# Patient Record
Sex: MALE | Race: OTHER | NOT HISPANIC OR LATINO | Employment: FULL TIME | URBAN - METROPOLITAN AREA
[De-identification: names, ages, dates, MRNs, and addresses within clinical notes are randomized per-mention and may not be internally consistent; named-entity substitution may affect disease eponyms.]

---

## 2019-01-01 ENCOUNTER — HOSPITAL ENCOUNTER (EMERGENCY)
Facility: HOSPITAL | Age: 54
Discharge: HOME | End: 2019-01-01
Attending: EMERGENCY MEDICINE | Admitting: EMERGENCY MEDICINE

## 2019-01-01 ENCOUNTER — APPOINTMENT (EMERGENCY)
Dept: CT IMAGING | Facility: HOSPITAL | Age: 54
End: 2019-01-01

## 2019-01-01 VITALS
WEIGHT: 168 LBS | DIASTOLIC BLOOD PRESSURE: 80 MMHG | HEIGHT: 65 IN | BODY MASS INDEX: 27.99 KG/M2 | SYSTOLIC BLOOD PRESSURE: 135 MMHG | TEMPERATURE: 97 F | RESPIRATION RATE: 16 BRPM | HEART RATE: 68 BPM | OXYGEN SATURATION: 98 %

## 2019-01-01 DIAGNOSIS — R10.31 RIGHT INGUINAL PAIN: Primary | ICD-10-CM

## 2019-01-01 DIAGNOSIS — L03.314 CELLULITIS OF RIGHT GROIN: ICD-10-CM

## 2019-01-01 LAB
ANION GAP SERPL CALCULATED.3IONS-SCNC: 5 MMOL/L (ref 5–14)
BACTERIA UR QL AUTO: ABNORMAL /HPF
BASOPHILS # BLD AUTO: 0 THOUSANDS/ΜL (ref 0–0.1)
BASOPHILS NFR BLD AUTO: 0 % (ref 0–1)
BILIRUB UR QL STRIP: NEGATIVE
BUN SERPL-MCNC: 15 MG/DL (ref 5–25)
CALCIUM SERPL-MCNC: 8.9 MG/DL (ref 8.4–10.2)
CHLORIDE SERPL-SCNC: 100 MMOL/L (ref 97–108)
CLARITY UR: CLEAR
CO2 SERPL-SCNC: 33 MMOL/L (ref 22–30)
COLOR UR: ABNORMAL
CREAT SERPL-MCNC: 0.82 MG/DL (ref 0.7–1.5)
EOSINOPHIL # BLD AUTO: 0.1 THOUSAND/ΜL (ref 0–0.4)
EOSINOPHIL NFR BLD AUTO: 1 % (ref 0–6)
ERYTHROCYTE [DISTWIDTH] IN BLOOD BY AUTOMATED COUNT: 13.1 %
GFR SERPL CREATININE-BSD FRML MDRD: 100 ML/MIN/1.73SQ M
GLUCOSE SERPL-MCNC: 92 MG/DL (ref 70–99)
GLUCOSE UR STRIP-MCNC: NEGATIVE MG/DL
HCT VFR BLD AUTO: 40.1 % (ref 41–53)
HGB BLD-MCNC: 13.4 G/DL (ref 13.5–17.5)
HGB UR QL STRIP.AUTO: 25
KETONES UR STRIP-MCNC: NEGATIVE MG/DL
LACTATE SERPL-SCNC: 1 MMOL/L (ref 0.7–2)
LEUKOCYTE ESTERASE UR QL STRIP: NEGATIVE
LYMPHOCYTES # BLD AUTO: 1.1 THOUSANDS/ΜL (ref 0.5–4)
LYMPHOCYTES NFR BLD AUTO: 12 % (ref 25–45)
MCH RBC QN AUTO: 29.9 PG (ref 26–34)
MCHC RBC AUTO-ENTMCNC: 33.5 G/DL (ref 31–36)
MCV RBC AUTO: 89 FL (ref 80–100)
MONOCYTES # BLD AUTO: 0.5 THOUSAND/ΜL (ref 0.2–0.9)
MONOCYTES NFR BLD AUTO: 6 % (ref 1–10)
NEUTROPHILS # BLD AUTO: 7.4 THOUSANDS/ΜL (ref 1.8–7.8)
NEUTS SEG NFR BLD AUTO: 81 % (ref 45–65)
NITRITE UR QL STRIP: NEGATIVE
NON-SQ EPI CELLS URNS QL MICRO: ABNORMAL /HPF
PH UR STRIP.AUTO: 5 [PH] (ref 4.5–8)
PLATELET # BLD AUTO: 249 THOUSANDS/UL (ref 150–450)
PMV BLD AUTO: 6 FL (ref 8.9–12.7)
POTASSIUM SERPL-SCNC: 4.1 MMOL/L (ref 3.6–5)
PROT UR STRIP-MCNC: ABNORMAL MG/DL
RBC # BLD AUTO: 4.49 MILLION/UL (ref 4.5–5.9)
RBC #/AREA URNS AUTO: ABNORMAL /HPF
SODIUM SERPL-SCNC: 138 MMOL/L (ref 137–147)
SP GR UR STRIP.AUTO: 1.03 (ref 1–1.04)
UROBILINOGEN UA: NEGATIVE MG/DL
WBC # BLD AUTO: 9.2 THOUSAND/UL (ref 4.5–11)
WBC #/AREA URNS AUTO: ABNORMAL /HPF

## 2019-01-01 PROCEDURE — 81001 URINALYSIS AUTO W/SCOPE: CPT | Performed by: PHYSICIAN ASSISTANT

## 2019-01-01 PROCEDURE — 74177 CT ABD & PELVIS W/CONTRAST: CPT

## 2019-01-01 PROCEDURE — 83605 ASSAY OF LACTIC ACID: CPT | Performed by: PHYSICIAN ASSISTANT

## 2019-01-01 PROCEDURE — 36415 COLL VENOUS BLD VENIPUNCTURE: CPT | Performed by: PHYSICIAN ASSISTANT

## 2019-01-01 PROCEDURE — 85025 COMPLETE CBC W/AUTO DIFF WBC: CPT | Performed by: PHYSICIAN ASSISTANT

## 2019-01-01 PROCEDURE — 96374 THER/PROPH/DIAG INJ IV PUSH: CPT

## 2019-01-01 PROCEDURE — 80048 BASIC METABOLIC PNL TOTAL CA: CPT | Performed by: PHYSICIAN ASSISTANT

## 2019-01-01 PROCEDURE — 99284 EMERGENCY DEPT VISIT MOD MDM: CPT

## 2019-01-01 PROCEDURE — 96361 HYDRATE IV INFUSION ADD-ON: CPT

## 2019-01-01 RX ORDER — SULFAMETHOXAZOLE AND TRIMETHOPRIM 800; 160 MG/1; MG/1
1 TABLET ORAL 2 TIMES DAILY
Qty: 20 TABLET | Refills: 0 | Status: SHIPPED | OUTPATIENT
Start: 2019-01-01 | End: 2019-01-11

## 2019-01-01 RX ORDER — KETOROLAC TROMETHAMINE 30 MG/ML
15 INJECTION, SOLUTION INTRAMUSCULAR; INTRAVENOUS ONCE
Status: COMPLETED | OUTPATIENT
Start: 2019-01-01 | End: 2019-01-01

## 2019-01-01 RX ORDER — CEPHALEXIN 500 MG/1
500 CAPSULE ORAL 4 TIMES DAILY
Qty: 40 CAPSULE | Refills: 0 | Status: SHIPPED | OUTPATIENT
Start: 2019-01-01 | End: 2019-01-11

## 2019-01-01 RX ADMIN — SODIUM CHLORIDE 1000 ML: 0.9 INJECTION, SOLUTION INTRAVENOUS at 15:18

## 2019-01-01 RX ADMIN — KETOROLAC TROMETHAMINE 15 MG: 30 INJECTION, SOLUTION INTRAMUSCULAR at 15:20

## 2019-01-01 RX ADMIN — IOHEXOL 100 ML: 350 INJECTION, SOLUTION INTRAVENOUS at 16:26

## 2019-01-01 NOTE — ED PROVIDER NOTES
History  Chief Complaint   Patient presents with    Testicle Swelling     I have swelling and pain to my right testicle  There was a big pimple there, and  I popped it  This is a 50-year-old male patient who states for the last 4 days he has had pain and swelling originally stated over his right testicle however it is groin  The nursing notes stated that he popped a large pimple however he stated he is attempting to pop a but nothing has come to ahead and he has been unable to pop a pimple he has pain and fullness over his right inguinal canal down towards his scrotum but does not involve the scrotum  It hurts to do anything  He has taken nothing for the pain he does look in discomfort  No fever no chills no headache blurred vision double vision no cough congestion sore throat nausea vomiting diarrhea abdominal pain no chest pain or shortness of breath no urinary symptoms no testicular pain and swelling  No penile discharge  Differential diagnosis includes not limited to abscess, inguinal hernia, cellulitis he will have a CT scan            None       No past medical history on file  No past surgical history on file  No family history on file  I have reviewed and agree with the history as documented  Social History   Substance Use Topics    Smoking status: Not on file    Smokeless tobacco: Not on file    Alcohol use Not on file        Review of Systems   All other systems reviewed and are negative  Physical Exam  Physical Exam   Constitutional: He appears well-developed and well-nourished  HENT:   Head: Normocephalic and atraumatic  Right Ear: External ear normal    Left Ear: External ear normal    Nose: Nose normal    Mouth/Throat: Oropharynx is clear and moist    Eyes: Pupils are equal, round, and reactive to light  Conjunctivae are normal    Neck: Normal range of motion  Neck supple  Cardiovascular: Normal rate and regular rhythm      Pulmonary/Chest: Effort normal and breath sounds normal    Abdominal: Soft  Bowel sounds are normal  There is no tenderness  Genitourinary: Testes normal and penis normal  Cremasteric reflex is present  Neurological: He is alert  Skin: Skin is warm  Psychiatric: He has a normal mood and affect  His behavior is normal    Nursing note and vitals reviewed  Vital Signs  ED Triage Vitals [01/01/19 1400]   Temperature Pulse Respirations Blood Pressure SpO2   (!) 97 °F (36 1 °C) 71 16 143/83 98 %      Temp Source Heart Rate Source Patient Position - Orthostatic VS BP Location FiO2 (%)   Tympanic -- Sitting Left arm --      Pain Score       7           Vitals:    01/01/19 1400   BP: 143/83   Pulse: 71   Patient Position - Orthostatic VS: Sitting       Visual Acuity      ED Medications  Medications   sodium chloride 0 9 % bolus 1,000 mL (1,000 mL Intravenous New Bag 1/1/19 1518)   ketorolac (TORADOL) injection 15 mg (15 mg Intravenous Given 1/1/19 1520)   iohexol (OMNIPAQUE) 350 MG/ML injection (MULTI-DOSE) 100 mL (100 mL Intravenous Given 1/1/19 1626)       Diagnostic Studies  Results Reviewed     Procedure Component Value Units Date/Time    Basic metabolic panel [[id number]]  (Abnormal) Collected:  01/01/19 1516    Lab Status:  Final result Specimen:  Blood from Arm, Left Updated:  01/01/19 1550     Sodium 138 mmol/L      Potassium 4 1 mmol/L      Chloride 100 mmol/L      CO2 33 (H) mmol/L      ANION GAP 5 mmol/L      BUN 15 mg/dL      Creatinine 0 82 mg/dL      Glucose 92 mg/dL      Calcium 8 9 mg/dL      eGFR 100 ml/min/1 73sq m     Narrative:         National Kidney Disease Education Program recommendations are as follows:  GFR calculation is accurate only with a steady state creatinine  Chronic Kidney disease less than 60 ml/min/1 73 sq  meters  Kidney failure less than 15 ml/min/1 73 sq  meters      Lactic acid, plasma [[id number]]  (Normal) Collected:  01/01/19 1516    Lab Status:  Final result Specimen:  Blood from Arm, Left Updated: 01/01/19 1546     LACTIC ACID 1 0 mmol/L     Narrative:         Result may be elevated if tourniquet was used during collection  CBC and differential [[id number]]  (Abnormal) Collected:  01/01/19 1516    Lab Status:  Final result Specimen:  Blood from Arm, Left Updated:  01/01/19 1540     WBC 9 20 Thousand/uL      RBC 4 49 (L) Million/uL      Hemoglobin 13 4 (L) g/dL      Hematocrit 40 1 (L) %      MCV 89 fL      MCH 29 9 pg      MCHC 33 5 g/dL      RDW 13 1 %      MPV 6 0 (L) fL      Platelets 400 Thousands/uL      Neutrophils Relative 81 (H) %      Lymphocytes Relative 12 (L) %      Monocytes Relative 6 %      Eosinophils Relative 1 %      Basophils Relative 0 %      Neutrophils Absolute 7 40 Thousands/µL      Lymphocytes Absolute 1 10 Thousands/µL      Monocytes Absolute 0 50 Thousand/µL      Eosinophils Absolute 0 10 Thousand/µL      Basophils Absolute 0 00 Thousands/µL     Urine Microscopic [[id number]]  (Abnormal) Collected:  01/01/19 1501    Lab Status:  Final result Specimen:  Urine from Urine, Clean Catch Updated:  01/01/19 1525     RBC, UA 0-1 (A) /hpf      WBC, UA 0-1 (A) /hpf      Epithelial Cells Occasional /hpf      Bacteria, UA None Seen /hpf     UA w Reflex to Microscopic [[id number]]  (Abnormal) Collected:  01/01/19 1501    Lab Status:  Final result Specimen:  Urine from Urine, Clean Catch Updated:  01/01/19 1513     Color, UA Misty (A)     Clarity, UA Clear     Specific [place], UA 1 030     pH, UA 5 0     Leukocytes, UA Negative     Nitrite, UA Negative     Protein, UA 15 (Trace) (A) mg/dl      Glucose, UA Negative mg/dl      Ketones, UA Negative mg/dl      Bilirubin, UA Negative     Blood, UA 25 0 (A)     UROBILINOGEN UA Negative mg/dL                  CT abdomen pelvis with contrast   Final Result by Clarissa Bedoya MD (01/01 1646)   1  Inflammatory stranding and fluid present in the right inguinal region without well-circumscribed rim-enhancing collection to suggest abscess     2  Cardiomegaly  3   Hepatomegaly  4   Mild stool retention throughout the colon  Workstation performed: [id number]                    Procedures  Procedures       Phone Contacts  ED Phone Contact    ED Course  ED Course as of Jan 01 1717   Tue Jan 01, 2019 1713 CT scan reveals some inflammatory fluid right inguinal canal without abscess I spoke with Sonal Putnam and explained case in detail he feels patient should be placed on antibiotics and anti-inflammatories and he will see the patient tomorrow  The CT scan did not reveal any hernias  Patient does feel better after the Toradol  No white count no fever no testicular pain or swelling                                [facility]  CritCare Time    Disposition  Final diagnoses:   Right inguinal pain   Cellulitis of right groin     Time reflects when diagnosis was documented in both MDM as applicable and the Disposition within this note     Time User Action Codes Description Comment    1/1/2019  5:15 PM [facility], [address] [place] [address] [R10 30] Deep right inguinal pain     1/1/2019  5:15 PM [facility], Jonathan Remove [R10 30] Deep right inguinal pain     1/1/2019  5:15 PM [facility], [address] [place] [address] [R10 31] Right inguinal pain     1/1/2019  5:15 PM [facility], [address] [place] [address] [F11 039] Cellulitis of right groin       ED Disposition     ED Disposition Condition Comment    Discharge  Corbin Conception discharge to home/self care      Condition at discharge: Good        Follow-up Information     Follow up With Specialties Details Why Contact Info    Shyanne Berman MD General Surgery Go in 1 day  Barbra Strauss 1620            Patient's Medications   Discharge Prescriptions    CEPHALEXIN (KEFLEX) 500 MG CAPSULE    Take 1 capsule (500 mg total) by mouth 4 (four) times a day for 10 days       Start Date: 1/1/2019  End Date: 1/11/2019       Order Dose: 500 mg       Quantity: 40 capsule    Refills: 0    DICLOFENAC SODIUM (VOLTAREN) 50 MG EC TABLET    Take 1 tablet (50 mg total) by mouth 2 (two) times a day       Start Date: 1/1/2019  End Date: --       Order Dose: 50 mg       Quantity: 10 tablet    Refills: 0    SULFAMETHOXAZOLE-TRIMETHOPRIM (BACTRIM DS) 800-160 MG PER TABLET    Take 1 tablet by mouth 2 (two) times a day for 10 days smx-tmp DS (BACTRIM) 800-160 mg tabs (1tab q12 D10)       Start Date: 1/1/2019  End Date: 1/11/2019       Order Dose: 1 tablet       Quantity: 20 tablet    Refills: 0     No discharge procedures on file      ED Provider  Electronically Signed by           Nadine Ramires PA-C  01/01/19 7650

## 2019-01-01 NOTE — DISCHARGE INSTRUCTIONS
Cellulitis   WHAT YOU NEED TO KNOW:   Cellulitis is a skin infection caused by bacteria  Cellulitis may go away on its own or you may need treatment  Your healthcare provider may draw a [place] around the outside edges of your cellulitis  If your cellulitis spreads, your healthcare provider will see it outside of the [place]  DISCHARGE INSTRUCTIONS:   Call 911 if:   · You have sudden trouble breathing or chest pain  Return to the emergency department if:   · Your wound gets larger and more painful  · You feel a crackling under your skin when you touch it  · You have purple dots or bumps on your skin, or you see bleeding under your skin  · You have new swelling and pain in your legs  · The red, warm, swollen area gets larger  · You see red streaks coming from the infected area  Contact your healthcare provider if:   · You have a fever  · Your fever or pain does not go away or gets worse  · The area does not get smaller after 2 days of antibiotics  · Your skin is flaking or peeling off  · You have questions or concerns about your condition or care  Medicines:   · Antibiotics  help treat the bacterial infection  · NSAIDs , such as ibuprofen, help decrease swelling, pain, and fever  NSAIDs can cause stomach bleeding or kidney problems in certain people  If you take blood thinner medicine, always ask if NSAIDs are safe for you  Always read the medicine label and follow directions  Do not give these medicines to children under 10months of age without direction from your child's healthcare provider  · Acetaminophen  decreases pain and fever  It is available without a doctor's order  Ask how much to take and how often to take it  Follow directions  Read the labels of all other medicines you are using to see if they also contain acetaminophen, or ask your doctor or pharmacist  Acetaminophen can cause liver damage if not taken correctly   Do not use more than 4 grams (4,000 milligrams) total of acetaminophen in one day  · Take your medicine as directed  Contact your healthcare provider if you think your medicine is not helping or if you have side effects  Tell him or her if you are allergic to any medicine  Keep a list of the medicines, vitamins, and herbs you take  Include the amounts, and when and why you take them  Bring the list or the pill bottles to follow-up visits  Carry your medicine list with you in case of an emergency  Self-care:   · Elevate the area above the level of your heart  as often as you can  This will help decrease swelling and pain  Prop the area on pillows or blankets to keep it elevated comfortably  · Clean the area daily until the wound scabs over  Gently wash the area with soap and water  Pat dry  Use dressings as directed  · Place cool or warm, wet cloths on the area as directed  Use clean cloths and clean water  Leave it on the area until the cloth is room temperature  Pat the area dry with a clean, dry cloth  The cloths may help decrease pain  Prevent cellulitis:   · Do not scratch bug bites or areas of injury  You increase your risk for cellulitis by scratching these areas  · Do not share personal items, such as towels, clothing, and razors  · Clean exercise equipment  with germ-killing  before and after you use it  · Wash your hands often  Use soap and water  Wash your hands after you use the bathroom, change a child's diapers, or sneeze  Wash your hands before you prepare or eat food  Use lotion to prevent dry, cracked skin  · Wear pressure stockings as directed  You may be told to wear the stockings if you have peripheral edema  The stockings improve blood flow and decrease swelling  · Treat athlete's foot  This can help prevent the spread of a bacterial skin infection  Follow up with your healthcare provider within 3 days, or as directed:   Your healthcare provider will check if your cellulitis is getting better  You may need different medicine  Write down your questions so you remember to ask them during your visits  © 2017 [employer] Information is for End User's use only and may not be sold, redistributed or otherwise used for commercial purposes  All illustrations and images included in CareNotes® are the copyrighted property of A D A M , Inc  or [employer]  The above information is an  only  It is not intended as medical advice for individual conditions or treatments  Talk to your doctor, nurse or pharmacist before following any medical regimen to see if it is safe and effective for you  Groin Pain   WHAT YOU NEED TO KNOW:   Groin pain may be felt only in your groin, or it may spread to your buttocks, thigh, or knee  An injury to your hip joint, pelvic area, lower back, or thighs can cause groin pain  DISCHARGE INSTRUCTIONS:   Medicines: You may need any of the following:  · NSAIDs , such as ibuprofen, help decrease swelling, pain, and fever  This medicine is available with or without a doctor's order  NSAIDs can cause stomach bleeding or kidney problems in certain people  If you take blood thinner medicine, always ask if NSAIDs are safe for you  Always read the medicine label and follow directions  Do not give these medicines to children under 10months of age without direction from your child's healthcare provider  · Acetaminophen  decreases pain  It is available without a doctor's order  Ask how much to take and how often to take it  Follow directions  Acetaminophen can cause liver damage if not taken correctly  · Take your medicine as directed  Contact your healthcare provider if you think your medicine is not helping or if you have side effects  Tell him of her if you are allergic to any medicine  Keep a list of the medicines, vitamins, and herbs you take  Include the amounts, and when and why you take them   Bring the list or the pill bottles to follow-up visits  Carry your medicine list with you in case of an emergency  Follow up with your healthcare provider as directed: You may need to return for more tests  Write down your questions so you remember to ask them during your visits  Self-care:   · Rest  as much as possible  Avoid activities that cause or increase your pain  · Apply ice  on your groin for 15 to 20 minutes every hour or as directed  Use an ice pack, or put crushed ice in a plastic bag  Cover it with a towel  Ice helps prevent tissue damage and decreases swelling and pain  · Apply heat  on your groin for 20 to 30 minutes every 2 hours for as many days as directed  Heat helps decrease pain and muscle spasms  Contact your healthcare provider if:   · You have a fever  · You have questions or concerns about your condition or care  Return to the emergency department if:   · You have severe pain even after you take medicine  · You have pain or burning when you urinate  · You have pain on your side that spreads to your groin  © 2017 [facility] [facility] Information is for End User's use only and may not be sold, redistributed or otherwise used for commercial purposes  All illustrations and images included in CareNotes® are the copyrighted property of A D A M , Inc  or [employer]  The above information is an  only  It is not intended as medical advice for individual conditions or treatments  Talk to your doctor, nurse or pharmacist before following any medical regimen to see if it is safe and effective for you

## 2019-08-31 ENCOUNTER — APPOINTMENT (EMERGENCY)
Dept: RADIOLOGY | Facility: HOSPITAL | Age: 54
End: 2019-08-31

## 2019-08-31 ENCOUNTER — HOSPITAL ENCOUNTER (EMERGENCY)
Facility: HOSPITAL | Age: 54
Discharge: HOME | End: 2019-08-31
Attending: EMERGENCY MEDICINE | Admitting: EMERGENCY MEDICINE

## 2019-08-31 VITALS
DIASTOLIC BLOOD PRESSURE: 85 MMHG | OXYGEN SATURATION: 99 % | BODY MASS INDEX: 26.92 KG/M2 | SYSTOLIC BLOOD PRESSURE: 136 MMHG | WEIGHT: 161.8 LBS | HEART RATE: 77 BPM | RESPIRATION RATE: 16 BRPM | TEMPERATURE: 96.3 F

## 2019-08-31 DIAGNOSIS — S93.409A ANKLE SPRAIN: Primary | ICD-10-CM

## 2019-08-31 DIAGNOSIS — S86.819A STRAIN OF CALF MUSCLE, INITIAL ENCOUNTER: ICD-10-CM

## 2019-08-31 PROCEDURE — 99284 EMERGENCY DEPT VISIT MOD MDM: CPT | Performed by: EMERGENCY MEDICINE

## 2019-08-31 PROCEDURE — 73610 X-RAY EXAM OF ANKLE: CPT

## 2019-08-31 PROCEDURE — 99283 EMERGENCY DEPT VISIT LOW MDM: CPT

## 2019-08-31 PROCEDURE — 73590 X-RAY EXAM OF LOWER LEG: CPT

## 2019-08-31 RX ORDER — NAPROXEN 500 MG/1
500 TABLET ORAL 2 TIMES DAILY WITH MEALS
Qty: 30 TABLET | Refills: 0 | Status: SHIPPED | OUTPATIENT
Start: 2019-08-31 | End: 2019-09-09 | Stop reason: SDUPTHER

## 2019-08-31 RX ORDER — IBUPROFEN 600 MG/1
600 TABLET ORAL ONCE
Status: COMPLETED | OUTPATIENT
Start: 2019-08-31 | End: 2019-08-31

## 2019-08-31 RX ADMIN — IBUPROFEN 600 MG: 600 TABLET ORAL at 18:29

## 2019-08-31 NOTE — ED PROVIDER NOTES
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 Taking?   diclofenac sodium (VOLTAREN) 50 mg EC tablet   No No   Sig: Take 1 tablet (50 mg total) by mouth 2 (two) times a day      Facility-Administered Medications: None       History reviewed  No pertinent past medical history  History reviewed  No pertinent surgical history  History reviewed  No pertinent family history  I have reviewed and agree with the history as documented  Social History     Tobacco Use    Smoking status: Current Every Day Smoker     Packs/day: 1 00    Smokeless tobacco: Never Used   Substance Use Topics    Alcohol use: Never     Frequency: Never    Drug use: Yes     Types: Marijuana        Review of Systems   Constitutional: Negative  Negative for chills and fever  HENT: Negative  Negative for rhinorrhea, sore throat, trouble swallowing and voice change  Eyes: Negative  Negative for pain and visual disturbance  Respiratory: Negative  Negative for cough, shortness of breath and wheezing  Cardiovascular: Negative  Negative for chest pain and palpitations  Gastrointestinal: Negative for abdominal pain, diarrhea, nausea and vomiting  Genitourinary: Negative  Negative for dysuria and frequency  Musculoskeletal: Positive for joint swelling  Negative for neck pain and neck stiffness  Skin: Negative  Negative for rash  Neurological: Negative  Negative for dizziness, speech difficulty, weakness, light-headedness and numbness  Physical Exam  Physical Exam   Constitutional: He is oriented to person, place, and time  He appears well-developed and well-nourished  No distress  HENT:   Head: Normocephalic and atraumatic  Mouth/Throat: Oropharynx is clear and moist    Eyes: Pupils are equal, round, and reactive to light  Conjunctivae and EOM are normal    Neck: Normal range of motion  Neck supple  No tracheal deviation present  Cardiovascular: Normal rate, regular rhythm and intact distal pulses  Pulmonary/Chest: Effort normal and breath sounds normal  No respiratory distress  He has no wheezes  He has no rales  Abdominal: Soft  Bowel sounds are normal  He exhibits no distension  There is no tenderness  There is no rebound and no guarding  Musculoskeletal: Normal range of motion  He exhibits no deformity  Left lower leg: He exhibits tenderness, bony tenderness and swelling  He exhibits no deformity and no laceration  Legs:  Neurological: He is alert and oriented to person, place, and time  Skin: Skin is warm and dry  Capillary refill takes less than 2 seconds  No rash noted  Psychiatric: He has a normal mood and affect  His behavior is normal    Nursing note and vitals reviewed        Vital Signs  ED Triage Vitals   Temperature Pulse Respirations Blood Pressure SpO2   08/31/19 1808 08/31/19 1808 08/31/19 1808 08/31/19 1808 08/31/19 1808   (!) 96 3 °F (35 7 °C) 77 16 136/85 99 %      Temp Source Heart Rate Source Patient Position - Orthostatic VS BP Location FiO2 (%)   08/31/19 1808 08/31/19 1808 08/31/19 1808 08/31/19 1808 --   Tympanic Monitor Sitting Left arm       Pain Score       08/31/19 1829       Worst Possible Pain           Vitals:    08/31/19 1808   BP: 136/85 Pulse: 77   Patient Position - Orthostatic VS: Sitting         Visual Acuity      ED Medications  Medications   ibuprofen (MOTRIN) tablet 600 mg (600 mg Oral Given 8/31/19 1829)       Diagnostic Studies  Results Reviewed     None                 XR ankle 3+ views LEFT   Final Result by Alexandra Hamlin MD (08/31 1929)      No acute osseous abnormality  Workstation performed: [id number]         XR tibia fibula 2 views LEFT   Final Result by Alexandra Hamlin MD (08/31 1928)      No acute osseous abnormality  Workstation performed: [id number]                    Procedures  Procedures       ED Course                               MDM  Number of Diagnoses or Management Options  Ankle sprain:   Strain of calf muscle, initial encounter:   Diagnosis management comments: Patient is a 58-year-old male who presented for left leg pain  X-rays per my interpretation were negative for acute fracture  Performed a bedside ultrasound which showed patent femoral popliteal and posterior tibial veins  His pain improved here after oral analgesia in the emergency room  He was offered ankle stirrup which she took  Post application inspection showed good alignment and patient being neurovascularly intact with 2+ pulses and capillary refills on his 5 toes being less than 2 seconds  Patient also given crutches and advised to follow up with PCP and Orthopedics  Amount and/or Complexity of Data Reviewed  Tests in the radiology section of CPT®: ordered and reviewed        Disposition  Final diagnoses:    Ankle sprain   Strain of calf muscle, initial encounter     Time reflects when diagnosis was documented in both MDM as applicable and the Disposition within this note     Time User Action Codes Description Comment    8/31/2019  7:01 PM Elke Rosado [R35 370A] Ankle sprain     8/31/2019  7:01 PM Elke Rosado [O87 534I] Strain of calf muscle, initial encounter       ED Disposition     ED Disposition Condition Date/Time Comment    Discharge Stable Sat Aug 31, 2019  7:01 PM Ryan Ferguson discharge to home/self care  Follow-up Information     Follow up With Specialties Details Why Contact Info Additional [address] In 1 week  [address], [address] [address]  [address], [address], [address], [place], [place], [address] Orthopedic Surgery In 1 week  [address]  [address] [facility] [address] [phone] [address], [address],  [address] [place], [place], [address]          Discharge Medication List as of 8/31/2019  7:04 PM      START taking these medications    Details   naproxen (NAPROSYN) 500 mg tablet Take 1 tablet (500 mg total) by mouth 2 (two) times a day with meals, Starting Sat 8/31/2019, Print         CONTINUE these medications which have NOT CHANGED    Details   diclofenac sodium (VOLTAREN) 50 mg EC tablet Take 1 tablet (50 mg total) by mouth 2 (two) times a day, Starting Tue 1/1/2019, Print           No discharge procedures on file      ED Provider  Electronically Signed by           Luca Oswald DO  08/31/19 1945

## 2019-09-09 ENCOUNTER — APPOINTMENT (EMERGENCY)
Dept: NON INVASIVE DIAGNOSTICS | Facility: HOSPITAL | Age: 54
End: 2019-09-09

## 2019-09-09 ENCOUNTER — HOSPITAL ENCOUNTER (EMERGENCY)
Facility: HOSPITAL | Age: 54
Discharge: HOME | End: 2019-09-09
Attending: EMERGENCY MEDICINE

## 2019-09-09 ENCOUNTER — APPOINTMENT (EMERGENCY)
Dept: RADIOLOGY | Facility: HOSPITAL | Age: 54
End: 2019-09-09

## 2019-09-09 VITALS
RESPIRATION RATE: 18 BRPM | TEMPERATURE: 97.4 F | DIASTOLIC BLOOD PRESSURE: 70 MMHG | HEART RATE: 54 BPM | HEIGHT: 65 IN | SYSTOLIC BLOOD PRESSURE: 100 MMHG | BODY MASS INDEX: 26.99 KG/M2 | WEIGHT: 162 LBS | OXYGEN SATURATION: 99 %

## 2019-09-09 DIAGNOSIS — S93.402A LEFT ANKLE SPRAIN: Primary | ICD-10-CM

## 2019-09-09 DIAGNOSIS — S93.409A ANKLE SPRAIN: ICD-10-CM

## 2019-09-09 DIAGNOSIS — M79.89 CALF SWELLING: ICD-10-CM

## 2019-09-09 LAB
ALBUMIN SERPL BCP-MCNC: 3.6 G/DL (ref 3–5.2)
ALP SERPL-CCNC: 67 U/L (ref 43–122)
ALT SERPL W P-5'-P-CCNC: 31 U/L (ref 9–52)
ANION GAP SERPL CALCULATED.3IONS-SCNC: 2 MMOL/L (ref 5–14)
APTT PPP: 30 SECONDS (ref 25–32)
AST SERPL W P-5'-P-CCNC: 31 U/L (ref 17–59)
BASOPHILS # BLD AUTO: 0.1 THOUSANDS/ΜL (ref 0–0.1)
BASOPHILS NFR BLD AUTO: 1 % (ref 0–1)
BILIRUB SERPL-MCNC: 0.5 MG/DL
BUN SERPL-MCNC: 14 MG/DL (ref 5–25)
CALCIUM SERPL-MCNC: 8.9 MG/DL (ref 8.4–10.2)
CHLORIDE SERPL-SCNC: 100 MMOL/L (ref 97–108)
CO2 SERPL-SCNC: 35 MMOL/L (ref 22–30)
CREAT SERPL-MCNC: 0.72 MG/DL (ref 0.7–1.5)
EOSINOPHIL # BLD AUTO: 0.3 THOUSAND/ΜL (ref 0–0.4)
EOSINOPHIL NFR BLD AUTO: 4 % (ref 0–6)
ERYTHROCYTE [DISTWIDTH] IN BLOOD BY AUTOMATED COUNT: 13.6 %
GFR SERPL CREATININE-BSD FRML MDRD: 105 ML/MIN/1.73SQ M
GLUCOSE SERPL-MCNC: 86 MG/DL (ref 70–99)
HCT VFR BLD AUTO: 34.7 % (ref 41–53)
HGB BLD-MCNC: 11.9 G/DL (ref 13.5–17.5)
INR PPP: 0.99 (ref 0.91–1.09)
LYMPHOCYTES # BLD AUTO: 1.6 THOUSANDS/ΜL (ref 0.5–4)
LYMPHOCYTES NFR BLD AUTO: 21 % (ref 25–45)
MCH RBC QN AUTO: 30.2 PG (ref 26–34)
MCHC RBC AUTO-ENTMCNC: 34.2 G/DL (ref 31–36)
MCV RBC AUTO: 88 FL (ref 80–100)
MONOCYTES # BLD AUTO: 0.6 THOUSAND/ΜL (ref 0.2–0.9)
MONOCYTES NFR BLD AUTO: 8 % (ref 1–10)
NEUTROPHILS # BLD AUTO: 5.1 THOUSANDS/ΜL (ref 1.8–7.8)
NEUTS SEG NFR BLD AUTO: 67 % (ref 45–65)
PLATELET # BLD AUTO: 249 THOUSANDS/UL (ref 150–450)
PMV BLD AUTO: 6.2 FL (ref 8.9–12.7)
POTASSIUM SERPL-SCNC: 4.6 MMOL/L (ref 3.6–5)
PROT SERPL-MCNC: 7 G/DL (ref 5.9–8.4)
PROTHROMBIN TIME: 11 SECONDS (ref 9.8–12)
RBC # BLD AUTO: 3.93 MILLION/UL (ref 4.5–5.9)
SODIUM SERPL-SCNC: 137 MMOL/L (ref 137–147)
WBC # BLD AUTO: 7.6 THOUSAND/UL (ref 4.5–11)

## 2019-09-09 PROCEDURE — 93971 EXTREMITY STUDY: CPT | Performed by: SURGERY

## 2019-09-09 PROCEDURE — 85025 COMPLETE CBC W/AUTO DIFF WBC: CPT | Performed by: PHYSICIAN ASSISTANT

## 2019-09-09 PROCEDURE — 36415 COLL VENOUS BLD VENIPUNCTURE: CPT | Performed by: PHYSICIAN ASSISTANT

## 2019-09-09 PROCEDURE — 73590 X-RAY EXAM OF LOWER LEG: CPT

## 2019-09-09 PROCEDURE — 96372 THER/PROPH/DIAG INJ SC/IM: CPT

## 2019-09-09 PROCEDURE — 99284 EMERGENCY DEPT VISIT MOD MDM: CPT

## 2019-09-09 PROCEDURE — 73610 X-RAY EXAM OF ANKLE: CPT

## 2019-09-09 PROCEDURE — 80053 COMPREHEN METABOLIC PANEL: CPT | Performed by: PHYSICIAN ASSISTANT

## 2019-09-09 PROCEDURE — 85730 THROMBOPLASTIN TIME PARTIAL: CPT | Performed by: PHYSICIAN ASSISTANT

## 2019-09-09 PROCEDURE — 93971 EXTREMITY STUDY: CPT

## 2019-09-09 PROCEDURE — 99284 EMERGENCY DEPT VISIT MOD MDM: CPT | Performed by: PHYSICIAN ASSISTANT

## 2019-09-09 PROCEDURE — 85610 PROTHROMBIN TIME: CPT | Performed by: PHYSICIAN ASSISTANT

## 2019-09-09 RX ORDER — NAPROXEN 500 MG/1
500 TABLET ORAL 2 TIMES DAILY WITH MEALS
Qty: 30 TABLET | Refills: 0 | Status: SHIPPED | OUTPATIENT
Start: 2019-09-09

## 2019-09-09 RX ORDER — KETOROLAC TROMETHAMINE 30 MG/ML
15 INJECTION, SOLUTION INTRAMUSCULAR; INTRAVENOUS ONCE
Status: COMPLETED | OUTPATIENT
Start: 2019-09-09 | End: 2019-09-09

## 2019-09-09 RX ADMIN — KETOROLAC TROMETHAMINE 15 MG: 30 INJECTION, SOLUTION INTRAMUSCULAR at 16:41

## 2019-09-09 NOTE — ED PROVIDER NOTES
History  Chief Complaint   Patient presents with    Ankle Pain     Patient fell about a month ago and injured left ankle and leg  States he was treated at that time but is still having pain and swelling in his left ankle and calf  50-year-old male with no listed past medical history presenting for evaluation of left ankle and calf pain  Patient states about 2 weeks ago he was walking down the steps when he fell through the step causing pain to the left ankle and left calf  He was seen here on 08/31 for the same complaint where he had x-rays of the left ankle and left tib-fib that were both negative  He states since his discharge from the ED he has been ambulating with pain  He now has worsening swelling to the left lower extremity  Denies taking anything for pain prior to arrival   No numbness tingling or weakness  Prior to Admission Medications   Prescriptions Last Dose Informant Patient Reported? Taking?   diclofenac sodium (VOLTAREN) 50 mg EC tablet   No No   Sig: Take 1 tablet (50 mg total) by mouth 2 (two) times a day   naproxen (NAPROSYN) 500 mg tablet   No No   Sig: Take 1 tablet (500 mg total) by mouth 2 (two) times a day with meals   naproxen (NAPROSYN) 500 mg tablet   No No   Sig: Take 1 tablet (500 mg total) by mouth 2 (two) times a day with meals      Facility-Administered Medications: None       History reviewed  No pertinent past medical history  History reviewed  No pertinent surgical history  History reviewed  No pertinent family history  I have reviewed and agree with the history as documented  Social History     Tobacco Use    Smoking status: Current Every Day Smoker     Packs/day: 1 00    Smokeless tobacco: Never Used   Substance Use Topics    Alcohol use: Never     Frequency: Never    Drug use: Yes     Types: Marijuana        Review of Systems   All other systems reviewed and are negative        Physical Exam  Physical Exam   Constitutional: He is oriented to person, place, and time  He appears well-developed and well-nourished  No distress  HENT:   Head: Normocephalic and atraumatic  Eyes: Conjunctivae are normal    EOM grossly intact   Neck: Normal range of motion  Neck supple  No JVD present  Cardiovascular: Normal rate  Pulmonary/Chest: Effort normal    Abdominal: Soft  Musculoskeletal:        Legs:  FROM left lower extremity, visualized patient ambulating from triaged to exam room, DP and PT pulses intact left lower extremity, steady gait, cap refill brisk, strength and sensation intact throughout   Neurological: He is alert and oriented to person, place, and time  Skin: Skin is warm and dry  Capillary refill takes less than 2 seconds  Psychiatric: He has a normal mood and affect  His behavior is normal    Nursing note and vitals reviewed        Vital Signs  ED Triage Vitals [09/09/19 1423]   Temperature Pulse Respirations Blood Pressure SpO2   (!) 97 4 °F (36 3 °C) 75 20 107/57 99 %      Temp Source Heart Rate Source Patient Position - Orthostatic VS BP Location FiO2 (%)   Tympanic Monitor Sitting Left arm --      Pain Score       Worst Possible Pain           Vitals:    09/09/19 1423 09/09/19 1641   BP: 107/57 100/70   Pulse: 75 (!) 54   Patient Position - Orthostatic VS: Sitting Lying         Visual Acuity      ED Medications  Medications   ketorolac (TORADOL) injection 15 mg (15 mg Intramuscular Given 9/9/19 1641)       Diagnostic Studies  Results Reviewed     Procedure Component Value Units Date/Time    Protime-INR [[id number]]  (Normal) Collected:  09/09/19 1456    Lab Status:  Final result Specimen:  Blood from Arm, Right Updated:  09/09/19 1522     Protime 11 0 seconds      INR 0 99    APTT [[id number]]  (Normal) Collected:  09/09/19 1456    Lab Status:  Final result Specimen:  Blood from Arm, Right Updated:  09/09/19 1522     PTT 30 seconds     Comprehensive metabolic panel [[id number]]  (Abnormal) Collected:  09/09/19 1456    Lab Status:  Final result Specimen:  Blood from Arm, Right Updated:  09/09/19 1520     Sodium 137 mmol/L      Potassium 4 6 mmol/L      Chloride 100 mmol/L      CO2 35 mmol/L      ANION GAP 2 mmol/L      BUN 14 mg/dL      Creatinine 0 72 mg/dL      Glucose 86 mg/dL      Calcium 8 9 mg/dL      AST 31 U/L      ALT 31 U/L      Alkaline Phosphatase 67 U/L      Total Protein 7 0 g/dL      Albumin 3 6 g/dL      Total Bilirubin 0 50 mg/dL      eGFR 105 ml/min/1 73sq m     Narrative:       Hemolysis  National Kidney Disease Foundation guidelines for Chronic Kidney Disease (CKD):     Stage 1 with normal or high GFR (GFR > 90 mL/min/1 73 square meters)    Stage 2 Mild CKD (GFR = 60-89 mL/min/1 73 square meters)    Stage 3A Moderate CKD (GFR = 45-59 mL/min/1 73 square meters)    Stage 3B Moderate CKD (GFR = 30-44 mL/min/1 73 square meters)    Stage 4 Severe CKD (GFR = 15-29 mL/min/1 73 square meters)    Stage 5 End Stage CKD (GFR <15 mL/min/1 73 square meters)  Note: GFR calculation is accurate only with a steady state creatinine    CBC and differential [[id number]]  (Abnormal) Collected:  09/09/19 1456    Lab Status:  Final result Specimen:  Blood from Arm, Right Updated:  09/09/19 1517     WBC 7 60 Thousand/uL      RBC 3 93 Million/uL      Hemoglobin 11 9 g/dL      Hematocrit 34 7 %      MCV 88 fL      MCH 30 2 pg      MCHC 34 2 g/dL      RDW 13 6 %      MPV 6 2 fL      Platelets 713 Thousands/uL      Neutrophils Relative 67 %      Lymphocytes Relative 21 %      Monocytes Relative 8 %      Eosinophils Relative 4 %      Basophils Relative 1 %      Neutrophils Absolute 5 10 Thousands/µL      Lymphocytes Absolute 1 60 Thousands/µL      Monocytes Absolute 0 60 Thousand/µL      Eosinophils Absolute 0 30 Thousand/µL      Basophils Absolute 0 10 Thousands/µL                  XR ankle 3+ views LEFT   Final Result by Deny Browning DO (09/09 1650)   No change from prior exam             Workstation performed: [id number]         XR tibia fibula 2 vw left   Final Result by Stepan Coffman DO (09/09 1652)   No acute osseous abnormality  Workstation performed: [id number]         VAS lower limb venous duplex study, unilateral/limited   Final Result by Rolando Hudson DO (09/09 1532)                 Procedures  Procedures       ED Course  ED Course as of Sep 10 1354   Mon Sep 09, 2019   1638 Awaiting final read on xray                                  MDM  Number of Diagnoses or Management Options  Calf swelling:   Left ankle sprain:   Diagnosis management comments: 71-year-old male presenting for evaluation of continued pain and now swelling the left lower extremity after falling through a step while walking down a staircase approximately 2 weeks ago  DVT study of the left lower extremity was negative, the ultrasound tech did note a incidental finding of what appears to be a cyst and mid calf, advised patient if incidental finding, advised to restrict activity compress the area with Ace bandage and elevate, pt is distally neurovascularly intact, follow up with PCP and Orthopedics as an outpatient    All labs and imaging discussed with patient, strict return to ED precautions discussed  Pt verbalizes understanding and agrees with plan  Pt is stable for discharge    Portions of the record may have been created with voice recognition software  Occasional wrong word or "sound a like" substitutions may have occurred due to the inherent limitations of voice recognition software  Read the chart carefully and recognize, using context, where substitutions have occurred          Disposition  Final diagnoses:   Left ankle sprain   Calf swelling     Time reflects when diagnosis was documented in both MDM as applicable and the Disposition within this note     Time User Action Codes Description Comment    9/9/2019  5:03 PM Stefano Rosado [S93 402A] Left ankle sprain     9/9/2019  5:03 PM Stefano Rosado [M79 89] Calf swelling     9/9/2019  5:03 PM Sergio Álvarez Add [R12 423I] Ankle sprain       ED Disposition     ED Disposition Condition Date/Time Comment    Discharge Stable Mon Sep 9, 2019  5:03 PM Kurt Cooley discharge to home/self care  Follow-up Information     Follow up With Specialties Details Why [address], [place] Orthopedic Surgery Call in 1 day  [facility] [address] [place]            Discharge Medication List as of 9/9/2019  5:04 PM      CONTINUE these medications which have CHANGED    Details   naproxen (NAPROSYN) 500 mg tablet Take 1 tablet (500 mg total) by mouth 2 (two) times a day with meals, Starting Mon 9/9/2019, Print         CONTINUE these medications which have NOT CHANGED    Details   diclofenac sodium (VOLTAREN) 50 mg EC tablet Take 1 tablet (50 mg total) by mouth 2 (two) times a day, Starting Tue 1/1/2019, Print           No discharge procedures on file      ED Provider  Electronically Signed by           Braxton Lux PA-C  09/10/19 5145

## 2019-09-19 ENCOUNTER — APPOINTMENT (EMERGENCY)
Dept: CT IMAGING | Facility: HOSPITAL | Age: 54
End: 2019-09-19

## 2019-09-19 ENCOUNTER — APPOINTMENT (EMERGENCY)
Dept: RADIOLOGY | Facility: HOSPITAL | Age: 54
End: 2019-09-19

## 2019-09-19 ENCOUNTER — APPOINTMENT (EMERGENCY)
Dept: NON INVASIVE DIAGNOSTICS | Facility: HOSPITAL | Age: 54
End: 2019-09-19

## 2019-09-19 ENCOUNTER — HOSPITAL ENCOUNTER (EMERGENCY)
Facility: HOSPITAL | Age: 54
Discharge: HOME | End: 2019-09-19
Attending: EMERGENCY MEDICINE

## 2019-09-19 VITALS
HEART RATE: 89 BPM | TEMPERATURE: 97 F | DIASTOLIC BLOOD PRESSURE: 91 MMHG | WEIGHT: 151.01 LBS | HEIGHT: 65 IN | SYSTOLIC BLOOD PRESSURE: 134 MMHG | OXYGEN SATURATION: 97 % | RESPIRATION RATE: 16 BRPM | BODY MASS INDEX: 25.16 KG/M2

## 2019-09-19 DIAGNOSIS — M79.605 LOWER EXTREMITY PAIN, LEFT: ICD-10-CM

## 2019-09-19 DIAGNOSIS — M79.89 LEFT LEG SWELLING: ICD-10-CM

## 2019-09-19 DIAGNOSIS — L03.116 CELLULITIS OF LEFT ANKLE: ICD-10-CM

## 2019-09-19 DIAGNOSIS — S86.012A ACHILLES RUPTURE, LEFT: Primary | ICD-10-CM

## 2019-09-19 LAB
ALBUMIN SERPL BCP-MCNC: 4 G/DL (ref 3–5.2)
ALP SERPL-CCNC: 105 U/L (ref 43–122)
ALT SERPL W P-5'-P-CCNC: 37 U/L (ref 9–52)
ANION GAP SERPL CALCULATED.3IONS-SCNC: 2 MMOL/L (ref 5–14)
AST SERPL W P-5'-P-CCNC: 32 U/L (ref 17–59)
BASOPHILS # BLD AUTO: 0 THOUSANDS/ΜL (ref 0–0.1)
BASOPHILS NFR BLD AUTO: 1 % (ref 0–1)
BILIRUB SERPL-MCNC: 0.5 MG/DL
BUN SERPL-MCNC: 11 MG/DL (ref 5–25)
CALCIUM SERPL-MCNC: 9.4 MG/DL (ref 8.4–10.2)
CHLORIDE SERPL-SCNC: 102 MMOL/L (ref 97–108)
CO2 SERPL-SCNC: 35 MMOL/L (ref 22–30)
CREAT SERPL-MCNC: 0.83 MG/DL (ref 0.7–1.5)
CRP SERPL QL: 6.5 MG/L
EOSINOPHIL # BLD AUTO: 0.1 THOUSAND/ΜL (ref 0–0.4)
EOSINOPHIL NFR BLD AUTO: 1 % (ref 0–6)
ERYTHROCYTE [DISTWIDTH] IN BLOOD BY AUTOMATED COUNT: 12.8 %
ERYTHROCYTE [SEDIMENTATION RATE] IN BLOOD: 18 MM/HOUR (ref 0–10)
GFR SERPL CREATININE-BSD FRML MDRD: 99 ML/MIN/1.73SQ M
GLUCOSE SERPL-MCNC: 101 MG/DL (ref 70–99)
HCT VFR BLD AUTO: 40.6 % (ref 41–53)
HGB BLD-MCNC: 13.9 G/DL (ref 13.5–17.5)
LYMPHOCYTES # BLD AUTO: 1 THOUSANDS/ΜL (ref 0.5–4)
LYMPHOCYTES NFR BLD AUTO: 11 % (ref 25–45)
MCH RBC QN AUTO: 30.1 PG (ref 26–34)
MCHC RBC AUTO-ENTMCNC: 34.4 G/DL (ref 31–36)
MCV RBC AUTO: 88 FL (ref 80–100)
MONOCYTES # BLD AUTO: 0.3 THOUSAND/ΜL (ref 0.2–0.9)
MONOCYTES NFR BLD AUTO: 4 % (ref 1–10)
NEUTROPHILS # BLD AUTO: 7.6 THOUSANDS/ΜL (ref 1.8–7.8)
NEUTS SEG NFR BLD AUTO: 84 % (ref 45–65)
PLATELET # BLD AUTO: 295 THOUSANDS/UL (ref 150–450)
PMV BLD AUTO: 6.1 FL (ref 8.9–12.7)
POTASSIUM SERPL-SCNC: 4.5 MMOL/L (ref 3.6–5)
PROT SERPL-MCNC: 7.9 G/DL (ref 5.9–8.4)
RBC # BLD AUTO: 4.64 MILLION/UL (ref 4.5–5.9)
SODIUM SERPL-SCNC: 139 MMOL/L (ref 137–147)
WBC # BLD AUTO: 9 THOUSAND/UL (ref 4.5–11)

## 2019-09-19 PROCEDURE — 96372 THER/PROPH/DIAG INJ SC/IM: CPT

## 2019-09-19 PROCEDURE — 73610 X-RAY EXAM OF ANKLE: CPT

## 2019-09-19 PROCEDURE — 73590 X-RAY EXAM OF LOWER LEG: CPT

## 2019-09-19 PROCEDURE — 99284 EMERGENCY DEPT VISIT MOD MDM: CPT

## 2019-09-19 PROCEDURE — 80053 COMPREHEN METABOLIC PANEL: CPT | Performed by: FAMILY MEDICINE

## 2019-09-19 PROCEDURE — 36415 COLL VENOUS BLD VENIPUNCTURE: CPT | Performed by: FAMILY MEDICINE

## 2019-09-19 PROCEDURE — 93971 EXTREMITY STUDY: CPT

## 2019-09-19 PROCEDURE — 85652 RBC SED RATE AUTOMATED: CPT | Performed by: FAMILY MEDICINE

## 2019-09-19 PROCEDURE — 86140 C-REACTIVE PROTEIN: CPT | Performed by: FAMILY MEDICINE

## 2019-09-19 PROCEDURE — 85025 COMPLETE CBC W/AUTO DIFF WBC: CPT | Performed by: FAMILY MEDICINE

## 2019-09-19 PROCEDURE — 73701 CT LOWER EXTREMITY W/DYE: CPT

## 2019-09-19 PROCEDURE — 99285 EMERGENCY DEPT VISIT HI MDM: CPT | Performed by: EMERGENCY MEDICINE

## 2019-09-19 PROCEDURE — 93971 EXTREMITY STUDY: CPT | Performed by: SURGERY

## 2019-09-19 RX ORDER — KETOROLAC TROMETHAMINE 30 MG/ML
15 INJECTION, SOLUTION INTRAMUSCULAR; INTRAVENOUS ONCE
Status: COMPLETED | OUTPATIENT
Start: 2019-09-19 | End: 2019-09-19

## 2019-09-19 RX ORDER — SULFAMETHOXAZOLE AND TRIMETHOPRIM 800; 160 MG/1; MG/1
1 TABLET ORAL EVERY 12 HOURS SCHEDULED
Qty: 20 TABLET | Refills: 0 | Status: SHIPPED | OUTPATIENT
Start: 2019-09-19 | End: 2019-09-29

## 2019-09-19 RX ADMIN — KETOROLAC TROMETHAMINE 15 MG: 30 INJECTION, SOLUTION INTRAMUSCULAR; INTRAVENOUS at 11:34

## 2019-09-19 RX ADMIN — IOHEXOL 100 ML: 350 INJECTION, SOLUTION INTRAVENOUS at 14:03

## 2019-09-19 NOTE — ED ATTENDING ATTESTATION
9/19/2019  IMaxwell MD, saw and evaluated the patient  I have discussed the patient with the resident and agree with the resident's findings, Plan of Care, and MDM as documented in the resident's note, except where noted  All available labs and Radiology studies were reviewed  I was present for key portions of any procedure(s) performed by the resident and I was immediately available to provide assistance  At this point I agree with the current assessment done in the Emergency Department  I have conducted an independent evaluation of this patient:    Impression     1   Complete tear of the Achilles tendon in the critical zone approximately 4 8 cm above the calcaneal insertion with a 3 6 cm fluid-filled gap  2  Alexandria Pry is a fluid collection with rim enhancement extending along the medial soft tissues along the medial head of the gastrocnemius muscle   Findings most likely represent a hematoma given adjacent finding of Achilles tear   A abscess could have a   similar appearance in the setting of infection however is felt less likely given the adjacent Achilles tear  3   Subcutaneous edema and soft tissue stranding about the lower leg and foot compatible cellulitis  CT as above  Symptoms are likely due to a ruptured achilles tendon +/- a superficial cellulitis  Findings discussed with Orthopedics  Consultant recommended placing the patient in a CAM walker boot and having him follow up in the office next week for further management and surgical evaluation  Will also cover with a course of oral antibiotics  The patient was strongly encouraged to call Ortho and his PCP tomorrow morning to schedule follow up appointments  He is agreeable to this plan  Strict return precautions provided

## 2019-09-19 NOTE — ED PROVIDER NOTES
History  Chief Complaint   Patient presents with    Leg Pain     left leg    has been worse since last time he was here  did not take medication-has no medication     Milena Cuba is a 53year-old male patient with unremarkable past medical history who presents today to ED for ongoing left ankle and calf pain  Patient had a staircase incident, causing him to trip and fall about 2 months back  Patient reports he had two ED visits (08/31 and 09/09) for the same complaint  Previous visits were unremarkable for any osseous abnormalities of left ankle, left tibia/fibula and DVT was ruled out  Patient states since his previous ED visit the pain and swelling in left lower extremity have been getting worse  Patient denies any recent injuries or falls  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 Taking?   diclofenac sodium (VOLTAREN) 50 mg EC tablet   No No   Sig: Take 1 tablet (50 mg total) by mouth 2 (two) times a day   naproxen (NAPROSYN) 500 mg tablet   No No   Sig: Take 1 tablet (500 mg total) by mouth 2 (two) times a day with meals      Facility-Administered Medications: None       History reviewed  No pertinent past medical history  History reviewed  No pertinent surgical history  History reviewed  No pertinent family history  I have reviewed and agree with the history as documented      Social History     Tobacco Use    Smoking status: Current Every Day Smoker     Packs/day: 1 00    Smokeless tobacco: Never Used   Substance Use Topics    Alcohol use: Never Frequency: Never    Drug use: Yes     Types: Marijuana        Review of Systems   Constitutional: Positive for chills  Negative for appetite change and fever  HENT: Negative for sore throat and trouble swallowing  Respiratory: Negative for cough, chest tightness and shortness of breath  Cardiovascular: Negative for chest pain, palpitations and leg swelling  Gastrointestinal: Negative for abdominal pain, blood in stool, constipation, diarrhea and vomiting  Genitourinary: Negative for difficulty urinating, frequency and hematuria  Musculoskeletal: Positive for arthralgias and joint swelling  Skin: Negative for rash  Neurological: Positive for weakness  Negative for dizziness, numbness and headaches  Hematological: Negative for adenopathy  Psychiatric/Behavioral: Positive for agitation  Physical Exam  ED Triage Vitals [09/19/19 1012]   Temperature Pulse Respirations Blood Pressure SpO2   (!) 97 °F (36 1 °C) 89 16 134/91 97 %      Temp Source Heart Rate Source Patient Position - Orthostatic VS BP Location FiO2 (%)   Tympanic -- -- -- --      Pain Score       Worst Possible Pain             Orthostatic Vital Signs  Vitals:    09/19/19 1012   BP: 134/91   Pulse: 89       Physical Exam   Constitutional: He is oriented to person, place, and time  He appears well-developed and well-nourished  Patient lethargic on examination    HENT:   Head: Normocephalic and atraumatic  Nose: Nose normal    Mouth/Throat: Oropharynx is clear and moist    Eyes: Pupils are equal, round, and reactive to light  Conjunctivae and EOM are normal    Neck: Normal range of motion  Neck supple  Cardiovascular: Normal rate, regular rhythm, normal heart sounds and intact distal pulses  Right side intact distal pulses  Difficult to appreciate left distal pulses on exam due to edema and pain endorsed by patient   Used Doppler left dorsalis pedis and posterior tibial pulses intact    Pulmonary/Chest: Effort normal and breath sounds normal  No respiratory distress  Abdominal: Soft  There is no tenderness  There is no guarding  Musculoskeletal: He exhibits edema and tenderness  Left ankle: He exhibits swelling  He exhibits normal range of motion  Tenderness  Lateral malleolus tenderness found  Left lower leg: He exhibits tenderness and swelling  He exhibits no laceration  Legs:  RLE strength: 5/5  ROM active and passive normal    LLE strength: limited due to pain  ROM deferred due to pain  Neurological: He is alert and oriented to person, place, and time  Skin: Skin is warm  Psychiatric: He has a normal mood and affect  His behavior is normal  Judgment and thought content normal    Nursing note and vitals reviewed        ED Medications  Medications   ketorolac (TORADOL) injection 15 mg (15 mg Intramuscular Given 9/19/19 1134)   iohexol (OMNIPAQUE) 350 MG/ML injection (MULTI-DOSE) 100 mL (100 mL Intravenous Given 9/19/19 1403)       Diagnostic Studies  Results Reviewed     Procedure Component Value Units Date/Time    Sedimentation rate, automated [[id number]]  (Abnormal) Collected:  09/19/19 1129    Lab Status:  Final result Specimen:  Blood from Arm, Right Updated:  09/19/19 1540     Sed Rate 18 mm/hour     C-reactive protein [[id number]]  (Normal) Collected:  09/19/19 1129    Lab Status:  Final result Specimen:  Blood from Arm, Right Updated:  09/19/19 1207     CRP 6 5 mg/L     Comprehensive metabolic panel [[id number]]  (Abnormal) Collected:  09/19/19 1129    Lab Status:  Final result Specimen:  Blood from Arm, Right Updated:  09/19/19 1207     Sodium 139 mmol/L      Potassium 4 5 mmol/L      Chloride 102 mmol/L      CO2 35 mmol/L      ANION GAP 2 mmol/L      BUN 11 mg/dL      Creatinine 0 83 mg/dL      Glucose 101 mg/dL      Calcium 9 4 mg/dL      AST 32 U/L      ALT 37 U/L      Alkaline Phosphatase 105 U/L      Total Protein 7 9 g/dL      Albumin 4 0 g/dL      Total Bilirubin 0 50 mg/dL      eGFR 99 ml/min/1 73sq m     Narrative:       National Kidney Disease Foundation guidelines for Chronic Kidney Disease (CKD):     Stage 1 with normal or high GFR (GFR > 90 mL/min/1 73 square meters)    Stage 2 Mild CKD (GFR = 60-89 mL/min/1 73 square meters)    Stage 3A Moderate CKD (GFR = 45-59 mL/min/1 73 square meters)    Stage 3B Moderate CKD (GFR = 30-44 mL/min/1 73 square meters)    Stage 4 Severe CKD (GFR = 15-29 mL/min/1 73 square meters)    Stage 5 End Stage CKD (GFR <15 mL/min/1 73 square meters)  Note: GFR calculation is accurate only with a steady state creatinine    CBC and differential [[id number]]  (Abnormal) Collected:  09/19/19 1129    Lab Status:  Final result Specimen:  Blood from Arm, Right Updated:  09/19/19 1159     WBC 9 00 Thousand/uL      RBC 4 64 Million/uL      Hemoglobin 13 9 g/dL      Hematocrit 40 6 %      MCV 88 fL      MCH 30 1 pg      MCHC 34 4 g/dL      RDW 12 8 %      MPV 6 1 fL      Platelets 049 Thousands/uL      Neutrophils Relative 84 %      Lymphocytes Relative 11 %      Monocytes Relative 4 %      Eosinophils Relative 1 %      Basophils Relative 1 %      Neutrophils Absolute 7 60 Thousands/µL      Lymphocytes Absolute 1 00 Thousands/µL      Monocytes Absolute 0 30 Thousand/µL      Eosinophils Absolute 0 10 Thousand/µL      Basophils Absolute 0 00 Thousands/µL                  CT tibia fibula left w contrast   Final Result by Gemma Sterling MD (09/19 9021)   1  Complete tear of the Achilles tendon in the critical zone approximately 4 8 cm above the calcaneal insertion with a 3 6 cm fluid-filled gap  2   There is a fluid collection with rim enhancement extending along the medial soft tissues along the medial head of the gastrocnemius muscle  Findings most likely represent a hematoma given adjacent finding of Achilles tear  A abscess could have a    similar appearance in the setting of infection however is felt less likely given the adjacent Achilles tear     3   Subcutaneous edema and soft tissue stranding about the lower leg and foot compatible cellulitis  I personally discussed this study with Denise Tompkins on 9/19/2019 at 2:30 PM                Workstation performed: [id number]         VAS lower limb venous duplex study, unilateral/limited   Final Result by Edson Kate MD (09/19 5163)      XR ankle 3+ views LEFT   Final Result by Todd Diaz MD (09/19 0467)   Heel spurs      No acute osseous abnormality  Persistent but diminished diffuse soft tissue swelling      Workstation performed: [id number]         XR tibia fibula 2 views LEFT   Final Result by Todd Diaz MD (09/19 8193)      No acute osseous abnormality  Similar to prior study  Workstation performed: [id number]               Procedures  Procedures        ED Course                               MDM  Number of Diagnoses or Management Options  Achilles rupture, left:   Cellulitis of left ankle:   Diagnosis management comments: Celestine Schilder is a 53year-old male who presents today to ED for concern of left ankle and calf pain  Due to 2 similar ED visits for this concern, labs, inflammation markers ordered which came back benign  Xray left ankle, and Xray left tibia/fibula with no acute osseous abnormality  On physical exam, patient endorsed positive left Sarah sign  VAS Doppler left lower extremity negative on this visit  CT LLE showed complete tear of the Achilles tendon in the critical zone approximately 4 8 cm above the calcaneal insertion with a 3 6 cm fluid-filled gap  Subcutaneous edema and soft tissue stranding about the lower leg and foot compatible cellulitis  Ortho consulted over the phone and given his history it was recommended that he gets a cam walking santos  Stressed the importance of making an appointment with the orthopedic surgeon for further management and evaluation  There was no indication to admit patient   Physical exam consistent with cellulitis, will discharge regimen of Bactrim BID x 10 days  Follow up with PCP post ED visit  If symptoms worsen return to ED  Disposition  Final diagnoses:   Cellulitis of left ankle   Achilles rupture, left     Time reflects when diagnosis was documented in both MDM as applicable and the Disposition within this note     Time User Action Codes Description Comment    9/19/2019 11:40 AM Tura Millers Add [M79 605] Lower extremity pain, left     9/19/2019 11:40 AM Tura Millers Add [M79 89] Left leg swelling     9/19/2019  4:43 PM Kortney Ashing Add [S75 408] Cellulitis of left ankle     9/19/2019  4:43 PM Kortney Ashing Add [A23 927V] Achilles rupture, left     9/19/2019  4:44 PM Kortney Ashing Modify [G59 941] Cellulitis of left ankle     9/19/2019  4:44 PM Kortney Ashing Modify [O18 585Y] Achilles rupture, left       ED Disposition     ED Disposition Condition Date/Time Comment    Discharge Stable Thu Sep 19, 2019  4:27 PM Fred Sharma discharge to home/self care              Follow-up Information     Follow up With Specialties Details Why Contact Info Additional [address] [facility] Emergency Department Emergency Medicine Go to  If symptoms worsen [address] [address]  [place] [address] Orthopedic Surgery Schedule an appointment as soon as possible for a visit in 3 days  [address] [address]  [address] [place] [address],  [address] [place] [address], [phone]          Discharge Medication List as of 9/19/2019  4:44 PM      START taking these medications    Details   sulfamethoxazole-trimethoprim (BACTRIM DS) 800-160 mg per tablet Take 1 tablet by mouth every 12 (twelve) hours for 10 days, Starting Thu 9/19/2019, Until Sun 9/29/2019, Print         CONTINUE these medications which have NOT CHANGED    Details   diclofenac sodium (VOLTAREN) 50 mg EC tablet Take 1 tablet (50 mg total) by mouth 2 (two) times a day, Starting Tue 1/1/2019, Print      naproxen (NAPROSYN) 500 mg tablet Take 1 tablet (500 mg total) by mouth 2 (two) times a day with meals, Starting Mon 9/9/2019, Print           No discharge procedures on file  ED Provider  Attending physically available and evaluated Elidia Joshua I managed the patient along with the ED Attending      Electronically Signed by         Sharmaine Lopes MD  09/19/19 7100

## 2019-09-19 NOTE — ED NOTES
Spoke to the vascular tech states that she didn't see a blood clot but saw some form of an abscess/infection       Xiao Norris RN  09/19/19 0006    Dr Conn Raw informed of findings       Xiao Norris RN  09/19/19 8638

## 2020-01-26 ENCOUNTER — HOSPITAL ENCOUNTER (EMERGENCY)
Facility: HOSPITAL | Age: 55
Discharge: HOME | End: 2020-01-26
Attending: EMERGENCY MEDICINE | Admitting: EMERGENCY MEDICINE

## 2020-01-26 ENCOUNTER — HOSPITAL ENCOUNTER (EMERGENCY)
Facility: HOSPITAL | Age: 55
End: 2020-01-26
Attending: EMERGENCY MEDICINE | Admitting: EMERGENCY MEDICINE

## 2020-01-26 ENCOUNTER — APPOINTMENT (EMERGENCY)
Dept: RADIOLOGY | Facility: HOSPITAL | Age: 55
End: 2020-01-26

## 2020-01-26 VITALS
RESPIRATION RATE: 14 BRPM | TEMPERATURE: 98.8 F | BODY MASS INDEX: 27.51 KG/M2 | SYSTOLIC BLOOD PRESSURE: 117 MMHG | DIASTOLIC BLOOD PRESSURE: 63 MMHG | HEART RATE: 60 BPM | WEIGHT: 165.34 LBS | OXYGEN SATURATION: 96 %

## 2020-01-26 VITALS
SYSTOLIC BLOOD PRESSURE: 141 MMHG | RESPIRATION RATE: 18 BRPM | OXYGEN SATURATION: 97 % | HEART RATE: 52 BPM | DIASTOLIC BLOOD PRESSURE: 68 MMHG

## 2020-01-26 DIAGNOSIS — Z22.322 MRSA (METHICILLIN RESISTANT STAPH AUREUS) CULTURE POSITIVE: ICD-10-CM

## 2020-01-26 DIAGNOSIS — M79.645 PAIN OF FINGER OF LEFT HAND: ICD-10-CM

## 2020-01-26 DIAGNOSIS — L08.9 SUPERFICIAL INJURY OF LEFT INDEX FINGER WITH INFECTION: Primary | ICD-10-CM

## 2020-01-26 DIAGNOSIS — M79.89 SWELLING OF LEFT INDEX FINGER: ICD-10-CM

## 2020-01-26 DIAGNOSIS — L03.012 FELON OF FINGER OF LEFT HAND: Primary | ICD-10-CM

## 2020-01-26 DIAGNOSIS — S60.941A SUPERFICIAL INJURY OF LEFT INDEX FINGER WITH INFECTION: Primary | ICD-10-CM

## 2020-01-26 LAB
ALBUMIN SERPL BCP-MCNC: 3.9 G/DL (ref 3–5.2)
ALP SERPL-CCNC: 91 U/L (ref 43–122)
ALT SERPL W P-5'-P-CCNC: 45 U/L (ref 9–52)
ANION GAP SERPL CALCULATED.3IONS-SCNC: 6 MMOL/L (ref 5–14)
AST SERPL W P-5'-P-CCNC: 34 U/L (ref 17–59)
BASOPHILS # BLD AUTO: 0 THOUSANDS/ΜL (ref 0–0.1)
BASOPHILS NFR BLD AUTO: 1 % (ref 0–1)
BILIRUB SERPL-MCNC: 0.4 MG/DL
BUN SERPL-MCNC: 15 MG/DL (ref 5–25)
CALCIUM SERPL-MCNC: 8.8 MG/DL (ref 8.4–10.2)
CHLORIDE SERPL-SCNC: 100 MMOL/L (ref 97–108)
CO2 SERPL-SCNC: 31 MMOL/L (ref 22–30)
CREAT SERPL-MCNC: 0.84 MG/DL (ref 0.7–1.5)
EOSINOPHIL # BLD AUTO: 0.2 THOUSAND/ΜL (ref 0–0.4)
EOSINOPHIL NFR BLD AUTO: 3 % (ref 0–6)
ERYTHROCYTE [DISTWIDTH] IN BLOOD BY AUTOMATED COUNT: 13.1 %
GFR SERPL CREATININE-BSD FRML MDRD: 99 ML/MIN/1.73SQ M
GLUCOSE SERPL-MCNC: 104 MG/DL (ref 70–99)
HCT VFR BLD AUTO: 37.9 % (ref 41–53)
HGB BLD-MCNC: 12.8 G/DL (ref 13.5–17.5)
LYMPHOCYTES # BLD AUTO: 1.1 THOUSANDS/ΜL (ref 0.5–4)
LYMPHOCYTES NFR BLD AUTO: 17 % (ref 25–45)
MCH RBC QN AUTO: 29.5 PG (ref 26–34)
MCHC RBC AUTO-ENTMCNC: 33.7 G/DL (ref 31–36)
MCV RBC AUTO: 88 FL (ref 80–100)
MONOCYTES # BLD AUTO: 0.3 THOUSAND/ΜL (ref 0.2–0.9)
MONOCYTES NFR BLD AUTO: 5 % (ref 1–10)
NEUTROPHILS # BLD AUTO: 4.7 THOUSANDS/ΜL (ref 1.8–7.8)
NEUTS SEG NFR BLD AUTO: 74 % (ref 45–65)
PLATELET # BLD AUTO: 277 THOUSANDS/UL (ref 150–450)
PMV BLD AUTO: 5.9 FL (ref 8.9–12.7)
POTASSIUM SERPL-SCNC: 4.4 MMOL/L (ref 3.6–5)
PROT SERPL-MCNC: 7.6 G/DL (ref 5.9–8.4)
RBC # BLD AUTO: 4.33 MILLION/UL (ref 4.5–5.9)
SODIUM SERPL-SCNC: 137 MMOL/L (ref 137–147)
WBC # BLD AUTO: 6.3 THOUSAND/UL (ref 4.5–11)

## 2020-01-26 PROCEDURE — 99282 EMERGENCY DEPT VISIT SF MDM: CPT | Performed by: PHYSICIAN ASSISTANT

## 2020-01-26 PROCEDURE — 87070 CULTURE OTHR SPECIMN AEROBIC: CPT | Performed by: ORTHOPAEDIC SURGERY

## 2020-01-26 PROCEDURE — 87205 SMEAR GRAM STAIN: CPT | Performed by: ORTHOPAEDIC SURGERY

## 2020-01-26 PROCEDURE — 96365 THER/PROPH/DIAG IV INF INIT: CPT

## 2020-01-26 PROCEDURE — 99284 EMERGENCY DEPT VISIT MOD MDM: CPT

## 2020-01-26 PROCEDURE — 87147 CULTURE TYPE IMMUNOLOGIC: CPT | Performed by: ORTHOPAEDIC SURGERY

## 2020-01-26 PROCEDURE — 36415 COLL VENOUS BLD VENIPUNCTURE: CPT | Performed by: PHYSICIAN ASSISTANT

## 2020-01-26 PROCEDURE — 96360 HYDRATION IV INFUSION INIT: CPT

## 2020-01-26 PROCEDURE — 85025 COMPLETE CBC W/AUTO DIFF WBC: CPT | Performed by: PHYSICIAN ASSISTANT

## 2020-01-26 PROCEDURE — 73140 X-RAY EXAM OF FINGER(S): CPT

## 2020-01-26 PROCEDURE — NC001 PR NO CHARGE: Performed by: EMERGENCY MEDICINE

## 2020-01-26 PROCEDURE — 80053 COMPREHEN METABOLIC PANEL: CPT | Performed by: PHYSICIAN ASSISTANT

## 2020-01-26 PROCEDURE — 87186 SC STD MICRODIL/AGAR DIL: CPT | Performed by: ORTHOPAEDIC SURGERY

## 2020-01-26 PROCEDURE — 87040 BLOOD CULTURE FOR BACTERIA: CPT | Performed by: PHYSICIAN ASSISTANT

## 2020-01-26 PROCEDURE — NS001 PR NO SIGNATURE OR ATTESTATION: Performed by: ORTHOPAEDIC SURGERY

## 2020-01-26 PROCEDURE — 99283 EMERGENCY DEPT VISIT LOW MDM: CPT

## 2020-01-26 RX ORDER — CEPHALEXIN 500 MG/1
500 CAPSULE ORAL EVERY 6 HOURS SCHEDULED
Qty: 20 CAPSULE | Refills: 0 | Status: SHIPPED | OUTPATIENT
Start: 2020-01-26 | End: 2020-01-26 | Stop reason: SDUPTHER

## 2020-01-26 RX ORDER — CEPHALEXIN 500 MG/1
500 CAPSULE ORAL EVERY 6 HOURS SCHEDULED
Qty: 28 CAPSULE | Refills: 0 | Status: SHIPPED | OUTPATIENT
Start: 2020-01-26 | End: 2020-02-02

## 2020-01-26 RX ORDER — LIDOCAINE HYDROCHLORIDE 10 MG/ML
20 INJECTION, SOLUTION EPIDURAL; INFILTRATION; INTRACAUDAL; PERINEURAL ONCE
Status: COMPLETED | OUTPATIENT
Start: 2020-01-26 | End: 2020-01-26

## 2020-01-26 RX ORDER — CEFAZOLIN SODIUM 2 G/50ML
2000 SOLUTION INTRAVENOUS EVERY 8 HOURS
Status: DISCONTINUED | OUTPATIENT
Start: 2020-01-26 | End: 2020-01-27 | Stop reason: HOSPADM

## 2020-01-26 RX ADMIN — SODIUM CHLORIDE 1000 ML: 0.9 INJECTION, SOLUTION INTRAVENOUS at 17:58

## 2020-01-26 RX ADMIN — CEFAZOLIN SODIUM 2000 MG: 2 SOLUTION INTRAVENOUS at 22:38

## 2020-01-26 RX ADMIN — LIDOCAINE HYDROCHLORIDE 20 ML: 10 INJECTION, SOLUTION EPIDURAL; INFILTRATION; INTRACAUDAL; PERINEURAL at 21:18

## 2020-01-26 NOTE — EMTALA/ACUTE CARE TRANSFER
[facility] EMERGENCY DEPARTMENT  [address] [facility] [address]  [phone]  Dept: [address] [facility] CONSENT    NAME Niraj Chacon                                         1965                              MRN [id number]    I have been informed of my rights regarding examination, treatment, and transfer   by Dr Blaine Lara III, DO    Benefits: Specialized equipment and/or services available at the receiving facility (Include comment)________________________    Risks: Potential for delay in receiving treatment      Transfer Request   I acknowledge that my medical condition has been evaluated and explained to me by the emergency department physician or other qualified medical person and/or my attending physician who has recommended and offered to me further medical examination and treatment  I understand the Hospital's obligation with respect to the treatment and stabilization of my emergency medical condition  I nevertheless request to be transferred  I release the Hospital, the doctor, and any other persons caring for me from all responsibility or liability for any injury or ill effects that may result from my transfer and agree to accept all responsibility for the consequences of my choice to transfer, rather than receive stabilizing treatment at the Hospital  I understand that because the transfer is my request, my insurance may not provide reimbursement for the services  The Hospital will assist and direct me and my family in how to make arrangements for transfer, but the hospital is not liable for any fees charged by the transport service  In spite of this understanding, I refuse to consent to further medical examination and treatment which has been offered to me, and request transfer to  Corazon Sánchez Name, [address] : [facility] ED   I authorize the performance of emergency medical procedures and treatments upon me in both transit and upon arrival at the receiving facility  Additionally, I authorize the release of any and all medical records to the receiving facility and request they be transported with me, if possible  I authorize the performance of emergency medical procedures and treatments upon me in both transit and upon arrival at the receiving facility  Additionally, I authorize the release of any and all medical records to the receiving facility and request they be transported with me, if possible  I understand that the safest mode of transportation during a medical emergency is an ambulance and that the Hospital advocates the use of this mode of transport  Risks of traveling to the receiving facility by car, including absence of medical control, life sustaining equipment, such as oxygen, and medical personnel has been explained to me and I fully understand them  (JEROD CORRECT BOX BELOW)  [  ]  I consent to the stated transfer and to be transported by ambulance/helicopter  [  ]  I consent to the stated transfer, but refuse transportation by ambulance and accept full responsibility for my transportation by car  I understand the risks of non-ambulance transfers and I exonerate the Hospital and its staff from any deterioration in my condition that results from this refusal     X___________________________________________    DATE  20  TIME________  Signature of patient or legally responsible individual signing on patient behalf           RELATIONSHIP TO PATIENT_________________________          Provider Certification    NAME Usha Langston                                        [facility] 1965                              MRN [id number]    A medical screening exam was performed on the above named patient  Based on the examination:    Condition Necessitating Transfer The encounter diagnosis was Superficial injury of left index finger with infection      Patient Condition: The patient has been stabilized such that within reasonable medical probability, no material deterioration of the patient condition or the condition of the unborn child(sushma) is likely to result from the transfer    Reason for Transfer: Level of Care needed not available at this facility    Transfer Requirements: [address] ED   · Space available and qualified personnel available for treatment as acknowledged by    · Agreed to accept transfer and to provide appropriate medical treatment as acknowledged by       Dr Mclaughlin  · Appropriate medical records of the examination and treatment of the patient are provided at the time of transfer   [address], [address] _______  · Transfer will be performed by qualified personnel from    and appropriate transfer equipment as required, including the use of necessary and appropriate life support measures  Provider Certification: I have examined the patient and explained the following risks and benefits of being transferred/refusing transfer to the patient/family:  General risk, such as traffic hazards, adverse weather conditions, rough terrain or turbulence, possible failure of equipment (including vehicle or aircraft), or consequences of actions of persons outside the control of the transport personnel      Based on these reasonable risks and benefits to the patient and/or the unborn child(sushma), and based upon the information available at the time of the patients examination, I certify that the medical benefits reasonably to be expected from the provision of appropriate medical treatments at another medical facility outweigh the increasing risks, if any, to the individuals medical condition, and in the case of labor to the unborn child, from effecting the transfer      X____________________________________________ DATE 01/26/20        TIME_______      ORIGINAL - SEND TO MEDICAL RECORDS   COPY - SEND WITH PATIENT DURING TRANSFER

## 2020-01-26 NOTE — ED PROVIDER NOTES
History  Chief Complaint   Patient presents with    Finger Swelling      " 1 or 2 weeks ago got a wood splinter in finger and now is swelling "  c/o pain to left index finger  pt falling asleep during questioning     Patient is a right-handed 48-year-old male who reports a history of IV drug use who presents today for evaluation of left 1st finger pain and swelling  Patient reports this been ongoing worsening over the past 2 and half weeks  Patient reports he did have purulent drainage out of the finger over the past 2 days however reports continued worsening swelling and an inability to flex the finger or extend without pain  Patient denies fevers or chills or hand pain however does endorse significant 1st finger tenderness and pain  Patient reports he has not taken any medications to alleviate his symptoms  History provided by:  Patient   used: No    Hand Pain   Location:  Left 1st finger  Quality:  Aching throbbing swelling  Severity:  Severe  Onset quality:  Gradual  Duration:  3 weeks  Timing:  Constant  Progression:  Unchanged  Chronicity:  New  Context:  Splinter in the finger 3 weeks ago  Relieved by:  None  Worsened by:  Squeezing, use of a needle to attempt to remove  Ineffective treatments:  Squeezing  Associated symptoms: no abdominal pain, no chest pain, no congestion, no ear pain, no fatigue, no fever, no nausea, no rash, no rhinorrhea, no shortness of breath, no sore throat and no vomiting        Prior to Admission Medications   Prescriptions Last Dose Informant Patient Reported? Taking?   diclofenac sodium (VOLTAREN) 50 mg EC tablet   No No   Sig: Take 1 tablet (50 mg total) by mouth 2 (two) times a day   naproxen (NAPROSYN) 500 mg tablet   No No   Sig: Take 1 tablet (500 mg total) by mouth 2 (two) times a day with meals      Facility-Administered Medications: None       History reviewed  No pertinent past medical history  History reviewed   No pertinent surgical history  History reviewed  No pertinent family history  I have reviewed and agree with the history as documented  Social History     Tobacco Use    Smoking status: Current Every Day Smoker     Packs/day: 1 00    Smokeless tobacco: Never Used   Substance Use Topics    Alcohol use: Never     Frequency: Never    Drug use: Yes     Types: Marijuana, Heroin        Review of Systems   Constitutional: Negative for chills, fatigue and fever  HENT: Negative for congestion, ear pain, rhinorrhea and sore throat  Eyes: Negative for redness  Respiratory: Negative for chest tightness and shortness of breath  Cardiovascular: Negative for chest pain and palpitations  Gastrointestinal: Negative for abdominal pain, nausea and vomiting  Genitourinary: Negative for dysuria and hematuria  Musculoskeletal: Positive for arthralgias and joint swelling  Skin: Negative for rash  Neurological: Negative for dizziness, syncope, light-headedness and numbness  Physical Exam  Physical Exam   Constitutional: He is oriented to person, place, and time  He appears well-developed and well-nourished  HENT:   Head: Normocephalic and atraumatic  Eyes: Pupils are equal, round, and reactive to light  Neck: Normal range of motion  Cardiovascular: Normal rate, regular rhythm and normal heart sounds  Pulmonary/Chest: Effort normal and breath sounds normal    Abdominal: Soft  There is no tenderness  There is no guarding  Musculoskeletal: He exhibits edema and tenderness  Hands:  Lymphadenopathy:     He has no cervical adenopathy  Neurological: He is alert and oriented to person, place, and time  Skin: Skin is warm and dry  Capillary refill takes less than 2 seconds  Psychiatric: He has a normal mood and affect  Nursing note and vitals reviewed                    Vital Signs  ED Triage Vitals [01/26/20 1538]   Temperature Pulse Respirations Blood Pressure SpO2   (!) 97 2 °F (36 2 °C) 67 16 154/86 98 % Temp Source Heart Rate Source Patient Position - Orthostatic VS BP Location FiO2 (%)   Tympanic Monitor Sitting Left arm --      Pain Score       8           Vitals:    01/26/20 1538 01/26/20 1719   BP: 154/86 117/63   Pulse: 67 60   Patient Position - Orthostatic VS: Sitting          Visual Acuity      ED Medications  Medications   sodium chloride 0 9 % bolus 1,000 mL (0 mL Intravenous Stopped 1/26/20 1858)       Diagnostic Studies  Results Reviewed     Procedure Component Value Units Date/Time    Blood culture #2 [[id number]] Collected:  01/26/20 1716    Lab Status:   In process Specimen:  Blood from Arm, Left Updated:  01/26/20 1730    Comprehensive metabolic panel [[id number]]  (Abnormal) Collected:  01/26/20 1634    Lab Status:  Final result Specimen:  Blood from Arm, Right Updated:  01/26/20 1653     Sodium 137 mmol/L      Potassium 4 4 mmol/L      Chloride 100 mmol/L      CO2 31 mmol/L      ANION GAP 6 mmol/L      BUN 15 mg/dL      Creatinine 0 84 mg/dL      Glucose 104 mg/dL      Calcium 8 8 mg/dL      AST 34 U/L      ALT 45 U/L      Alkaline Phosphatase 91 U/L      Total Protein 7 6 g/dL      Albumin 3 9 g/dL      Total Bilirubin 0 40 mg/dL      eGFR 99 ml/min/1 73sq m     Narrative:       [place] guidelines for Chronic Kidney Disease (CKD):     Stage 1 with normal or high GFR (GFR > 90 mL/min/1 73 square meters)    Stage 2 Mild CKD (GFR = 60-89 mL/min/1 73 square meters)    Stage 3A Moderate CKD (GFR = 45-59 mL/min/1 73 square meters)    Stage 3B Moderate CKD (GFR = 30-44 mL/min/1 73 square meters)    Stage 4 Severe CKD (GFR = 15-29 mL/min/1 73 square meters)    Stage 5 End Stage CKD (GFR <15 mL/min/1 73 square meters)  Note: GFR calculation is accurate only with a steady state creatinine    CBC and differential [[id number]]  (Abnormal) Collected:  01/26/20 1634    Lab Status:  Final result Specimen:  Blood from Arm, Right Updated:  01/26/20 1644     WBC 6 30 Thousand/uL RBC 4 33 Million/uL      Hemoglobin 12 8 g/dL      Hematocrit 37 9 %      MCV 88 fL      MCH 29 5 pg      MCHC 33 7 g/dL      RDW 13 1 %      MPV 5 9 fL      Platelets 501 Thousands/uL      Neutrophils Relative 74 %      Lymphocytes Relative 17 %      Monocytes Relative 5 %      Eosinophils Relative 3 %      Basophils Relative 1 %      Neutrophils Absolute 4 70 Thousands/µL      Lymphocytes Absolute 1 10 Thousands/µL      Monocytes Absolute 0 30 Thousand/µL      Eosinophils Absolute 0 20 Thousand/µL      Basophils Absolute 0 00 Thousands/µL     Blood culture #1 [[id number]] Collected:  01/26/20 1634    Lab Status:   In process Specimen:  Blood from Arm, Right Updated:  01/26/20 1639                 XR finger second digit-index LEFT    (Results Pending)              Procedures  Procedures         ED Course                               MDM      Disposition  Final diagnoses:   Superficial injury of left index finger with infection     Time reflects when diagnosis was documented in both MDM as applicable and the Disposition within this note     Time User Action Codes Description Comment    1/26/2020  4:47 PM Nabeel Quinteros Add [D14 444P,  L08 9] Superficial injury of left index finger with infection       ED Disposition     ED Disposition Condition Date/Time Comment    Transfer to Another Facility-In Network  [place] Jan 26, 2020  4:47 PM London Aguilar should be transferred out to Emergency Department for orthopedic hand consult         MD Documentation      Most Recent Value   Patient Condition  The patient has been stabilized such that within reasonable medical probability, no material deterioration of the patient condition or the condition of the unborn child(sushma) is likely to result from the transfer   Reason for Transfer  Level of Care needed not available at this facility   Benefits of Transfer  Specialized equipment and/or services available at the receiving facility (Include comment)________________________ Risks of Transfer  Potential for delay in receiving treatment   Accepting Physician  Toni Louie Name, [facility] ED   Sending MD  [facility]   Provider Certification  General risk, such as traffic hazards, adverse weather conditions, rough terrain or turbulence, possible failure of equipment (including vehicle or aircraft), or consequences of actions of persons outside the control of the transport personnel      RN Documentation      Most [address] [place] Name, [facility] ED   Transport Mode  Ambulance   Level of Care  Basic life support      Follow-up Information    None         Discharge Medication List as of 1/26/2020  8:22 PM      CONTINUE these medications which have NOT CHANGED    Details   diclofenac sodium (VOLTAREN) 50 mg EC tablet Take 1 tablet (50 mg total) by mouth 2 (two) times a day, Starting Tue 1/1/2019, Print      naproxen (NAPROSYN) 500 mg tablet Take 1 tablet (500 mg total) by mouth 2 (two) times a day with meals, Starting Mon 9/9/2019, Print           No discharge procedures on file      ED Provider  Electronically Signed by           Bhavik Graves PA-C  01/26/20 1946

## 2020-01-27 NOTE — ED RE-EVALUATION NOTE
I saw the patient in the ED upon transfer from [place]  Pt with left index finger swelling and tenderness and concern for tenosynovitis, jazmin  Will have ortho see pt       Bethanie Valverde MD  01/26/20 2115

## 2020-01-27 NOTE — ED PROVIDER NOTES
58-year-old male who is presenting as a transfer from [address]  Patient was sent for Orthopedic Surgery evaluation due to concern for possible flexor tenosynovitis  I confirmed the history obtained by the physician assistant at [address]  The patient reports that he has had swelling of the index finger of his left hand for the past 2 weeks  He states that he was doing some woodworking when he had several splinters lodged into his left index finger  He attempted to remove these without success  Patient reports worsening swelling since that time  He is able to extend his finger without difficulty and has some difficulty flexing his finger fully  Patient denies any fever, shaking chills, or sweats  He has not taken any medications  Patient reports that he is left-hand dominant  He reports active heroin abuse  Vitals:    01/26/20 2045   BP: 141/68   Pulse: (!) 52   Resp: 18   SpO2: 97%     Patient seen and examined  Vital signs within normal limits  On examination, the patient has intact sensation in the median, ulnar, and radial nerve distributions  The patient has swelling of the index finger of the left hand  He has mild tenderness to palpation along the distal aspect of the index finger but does not have tenderness along the flexor tendon sheath  He has no pain with passive extension of the finger  Remainder of physical examination unremarkable  Orthopedic Surgery aware of the patient  They will come evaluate the patient as he was transferred to be evaluated by their service       Agusto Rowley MD  01/26/20 2119

## 2020-01-27 NOTE — DISCHARGE INSTRUCTIONS
Return to the ER with fever, shaking chills, worsening finger swelling, difficulty moving your finger, or any other concerning symptoms  Take antibiotic as prescribed  Follow-up with Orthopedic Surgery as directed in 3 days  Follow-up with drug and alcohol rehabilitation resources provided by crisis worker

## 2020-01-27 NOTE — ED NOTES
Report called to [facility]  EMS transport scheduled to  pt at 2000       Zander Rodriguez RN  01/26/20 9440

## 2020-01-29 ENCOUNTER — TELEPHONE (OUTPATIENT)
Dept: OBGYN CLINIC | Facility: HOSPITAL | Age: 55
End: 2020-01-29

## 2020-01-29 NOTE — TELEPHONE ENCOUNTER
I called [facility] to schedule a 1 wk appt  With Dr Lee Stone  There was no ring it it went right to a message that said " [employer]    Calling restrictions " Could not leave a message

## 2020-01-30 LAB
BACTERIA WND AEROBE CULT: ABNORMAL
GRAM STN SPEC: ABNORMAL
GRAM STN SPEC: ABNORMAL

## 2020-01-31 LAB
BACTERIA BLD CULT: NORMAL
BACTERIA BLD CULT: NORMAL

## 2020-02-08 RX ORDER — DOXYCYCLINE HYCLATE 100 MG/1
100 CAPSULE ORAL 2 TIMES DAILY
Qty: 20 CAPSULE | Refills: 0 | OUTPATIENT
Start: 2020-02-08 | End: 2020-02-18

## 2021-04-30 ENCOUNTER — HOSPITAL ENCOUNTER (EMERGENCY)
Facility: HOSPITAL | Age: 56
Discharge: HOME | End: 2021-04-30
Attending: EMERGENCY MEDICINE | Admitting: EMERGENCY MEDICINE
Payer: MEDICARE

## 2021-04-30 ENCOUNTER — APPOINTMENT (EMERGENCY)
Dept: RADIOLOGY | Facility: HOSPITAL | Age: 56
End: 2021-04-30
Payer: MEDICARE

## 2021-04-30 DIAGNOSIS — S62.639B OPEN FRACTURE OF TUFT OF DISTAL PHALANX OF FINGER: Primary | ICD-10-CM

## 2021-04-30 PROCEDURE — 99285 EMERGENCY DEPT VISIT HI MDM: CPT | Performed by: PHYSICIAN ASSISTANT

## 2021-04-30 PROCEDURE — 12002 RPR S/N/AX/GEN/TRNK2.6-7.5CM: CPT | Performed by: PHYSICIAN ASSISTANT

## 2021-04-30 PROCEDURE — 73130 X-RAY EXAM OF HAND: CPT

## 2021-04-30 PROCEDURE — 99283 EMERGENCY DEPT VISIT LOW MDM: CPT

## 2021-04-30 PROCEDURE — 90715 TDAP VACCINE 7 YRS/> IM: CPT | Performed by: EMERGENCY MEDICINE

## 2021-04-30 PROCEDURE — 29130 APPL FINGER SPLINT STATIC: CPT | Performed by: PHYSICIAN ASSISTANT

## 2021-04-30 PROCEDURE — 96372 THER/PROPH/DIAG INJ SC/IM: CPT

## 2021-04-30 PROCEDURE — 90471 IMMUNIZATION ADMIN: CPT

## 2021-04-30 RX ORDER — CEPHALEXIN 500 MG/1
500 CAPSULE ORAL EVERY 8 HOURS SCHEDULED
Qty: 21 CAPSULE | Refills: 0 | Status: SHIPPED | OUTPATIENT
Start: 2021-04-30 | End: 2021-05-07

## 2021-04-30 RX ORDER — LIDOCAINE HYDROCHLORIDE 20 MG/ML
5 INJECTION, SOLUTION EPIDURAL; INFILTRATION; INTRACAUDAL; PERINEURAL ONCE
Status: COMPLETED | OUTPATIENT
Start: 2021-04-30 | End: 2021-04-30

## 2021-04-30 RX ORDER — NAPROXEN 500 MG/1
500 TABLET ORAL 2 TIMES DAILY WITH MEALS
Qty: 30 TABLET | Refills: 0 | Status: SHIPPED | OUTPATIENT
Start: 2021-04-30 | End: 2021-09-11 | Stop reason: SDUPTHER

## 2021-04-30 RX ORDER — BUPIVACAINE HYDROCHLORIDE 5 MG/ML
10 INJECTION, SOLUTION EPIDURAL; INTRACAUDAL ONCE
Status: COMPLETED | OUTPATIENT
Start: 2021-04-30 | End: 2021-04-30

## 2021-04-30 RX ADMIN — LIDOCAINE HYDROCHLORIDE 250 MG: 10 INJECTION, SOLUTION EPIDURAL; INFILTRATION; INTRACAUDAL; PERINEURAL at 21:47

## 2021-04-30 RX ADMIN — BUPIVACAINE HYDROCHLORIDE 10 ML: 5 INJECTION, SOLUTION EPIDURAL; INTRACAUDAL; PERINEURAL at 21:20

## 2021-04-30 RX ADMIN — LIDOCAINE HYDROCHLORIDE 5 ML: 20 INJECTION, SOLUTION EPIDURAL; INFILTRATION; INTRACAUDAL; PERINEURAL at 21:19

## 2021-04-30 RX ADMIN — TETANUS TOXOID, REDUCED DIPHTHERIA TOXOID AND ACELLULAR PERTUSSIS VACCINE, ADSORBED 0.5 ML: 5; 2.5; 8; 8; 2.5 SUSPENSION INTRAMUSCULAR at 21:46

## 2021-04-30 NOTE — Clinical Note
Margozeus Saulo was seen and treated in our emergency department on 2021  Diagnosis:     Emma Harrington  may return to work on return date  He may return on this date: 2021         If you have any questions or concerns, please don't hesitate to call        Yulisa Hoffman PA-C    ______________________________           _______________          _______________  Hospital Representative                              Date                                Time

## 2021-05-01 NOTE — DISCHARGE INSTRUCTIONS
Take medications as directed  Complete entire course of antibiotics  Keep splint in place until seen by hand surgeon  Call tomorrow to make an appointment  Try not to get the area wet  Return for  new or worsening complaints  Follow up with primary care

## 2021-05-01 NOTE — ED PROVIDER NOTES
History  Chief Complaint   Patient presents with    Finger Injury     Moving a refrigerator and got finger smashed under the refrigerator  [de-identified] year old left-handed male without significant past medical history presents complaining of left hand injury that occurred just prior to arrival   Patient tells me that he was helping someone move a refrigerator when they dropped it on his hand  Patient presents screaming I think my finger fell off presents with finger wrapped in a towel  Unsure of last tetanus shot  Denies any other injury sustained  Has not taken any medication prior to arrival       History provided by:  Patient   used: No        Prior to Admission Medications   Prescriptions Last Dose Informant Patient Reported? Taking?   diclofenac sodium (VOLTAREN) 50 mg EC tablet   No No   Sig: Take 1 tablet (50 mg total) by mouth 2 (two) times a day   naproxen (NAPROSYN) 500 mg tablet   No No   Sig: Take 1 tablet (500 mg total) by mouth 2 (two) times a day with meals      Facility-Administered Medications: None       History reviewed  No pertinent past medical history  History reviewed  No pertinent surgical history  History reviewed  No pertinent family history  I have reviewed and agree with the history as documented  E-Cigarette/Vaping    E-Cigarette Use Never User      E-Cigarette/Vaping Substances     Social History     Tobacco Use    Smoking status: Current Every Day Smoker     Packs/day: 1 00    Smokeless tobacco: Never Used   Substance Use Topics    Alcohol use: Never     Frequency: Never    Drug use: Yes     Types: Marijuana, Heroin       Review of Systems   Constitutional: Negative  Negative for chills and fatigue  HENT: Negative for ear pain and sore throat  Eyes: Negative for photophobia and redness  Respiratory: Negative for apnea, cough and shortness of breath  Cardiovascular: Negative for chest pain     Gastrointestinal: Negative for abdominal pain, nausea and vomiting  Genitourinary: Negative for dysuria  Musculoskeletal: Negative for arthralgias, neck pain and neck stiffness  Skin: Positive for wound  Negative for rash  L 4th digit   Neurological: Negative for dizziness, tremors, syncope and weakness  Psychiatric/Behavioral: Negative for suicidal ideas  Physical Exam  Physical Exam  Constitutional:       General: He is not in acute distress  Appearance: He is well-developed  He is not diaphoretic  Eyes:      Pupils: Pupils are equal, round, and reactive to light  Neck:      Musculoskeletal: Normal range of motion and neck supple  Cardiovascular:      Rate and Rhythm: Normal rate and regular rhythm  Pulmonary:      Effort: Pulmonary effort is normal  No respiratory distress  Breath sounds: Normal breath sounds  Abdominal:      General: Bowel sounds are normal  There is no distension  Palpations: Abdomen is soft  Musculoskeletal: Normal range of motion  Skin:     General: Skin is warm and dry  Comments: 3 cm linear horizontal laceration to the palmar aspect of the left 4th digit in the area of the DIP  No nail involvement  No subungual hematoma  Tenderness appreciated distally  Range of motion intact  No obvious tendon nerve involvement  Using blood without pulsatile bleeding  Radial pulse 2+   Neurological:      Mental Status: He is alert and oriented to person, place, and time  Vital Signs  ED Triage Vitals   Temp Pulse Resp BP SpO2   -- -- -- -- --      Temp src Heart Rate Source Patient Position - Orthostatic VS BP Location FiO2 (%)   -- -- -- -- --      Pain Score       --           There were no vitals filed for this visit        Visual Acuity      ED Medications  Medications   lidocaine (PF) (XYLOCAINE-MPF) 2 % injection 5 mL (5 mL Infiltration Given by Other 4/30/21 2119)   bupivacaine (PF) (MARCAINE) 0 5 % injection 10 mL (10 mL Injection Given by Other 4/30/21 2120) tetanus-diphtheria-acellular pertussis (BOOSTRIX) IM injection 0 5 mL (0 5 mL Intramuscular Given 4/30/21 2146)   cefTRIAXone (ROCEPHIN) 250 mg in lidocaine (PF) (XYLOCAINE-MPF) 1 % IM only syringe (250 mg Intramuscular Given 4/30/21 2147)       Diagnostic Studies  Results Reviewed     None                 XR hand 3+ views LEFT    (Results Pending)              Procedures  Splint application    Date/Time: 5/1/2021 12:14 AM  Performed by: Dominique Mahmood PA-C  Authorized by: Dominique Mahmood PA-C   Universal Protocol:  Consent: Verbal consent obtained  Risks and benefits: risks, benefits and alternatives were discussed  Consent given by: patient  Time out: Immediately prior to procedure a "time out" was called to verify the correct patient, procedure, equipment, support staff and site/side marked as required  Patient understanding: patient states understanding of the procedure being performed  Patient consent: the patient's understanding of the procedure matches consent given  Procedure consent: procedure consent matches procedure scheduled  Relevant documents: relevant documents present and verified  Test results: test results available and properly labeled  Site marked: the operative site was marked  Radiology Images displayed and confirmed  If images not available, report reviewed: imaging studies available  Patient identity confirmed: verbally with patient      Pre-procedure details:     Sensation:  Normal  Procedure details:     Laterality:  Left    Location:  Finger    Finger:  L index fingerCast type:  Finger      Splint type:  Finger splint, static  Post-procedure details:     Pain:  Improved    Sensation:  Normal    Patient tolerance of procedure: Tolerated well, no immediate complications  Laceration repair    Date/Time: 5/1/2021 12:24 AM  Performed by: Dominique Mahmood PA-C  Authorized by: Dominique Mahmood PA-C   Consent: Verbal consent obtained    Risks and benefits: risks, benefits and alternatives were discussed  Consent given by: patient  Patient understanding: patient states understanding of the procedure being performed  Patient consent: the patient's understanding of the procedure matches consent given  Procedure consent: procedure consent matches procedure scheduled  Relevant documents: relevant documents present and verified  Test results: test results available and properly labeled  Site marked: the operative site was marked  Radiology Images displayed and confirmed  If images not available, report reviewed: imaging studies available  Patient identity confirmed: verbally with patient  Time out: Immediately prior to procedure a "time out" was called to verify the correct patient, procedure, equipment, support staff and site/side marked as required  Body area: upper extremity  Location details: left index finger  Laceration length: 5 cm  Foreign bodies: no foreign bodies  Tendon involvement: none  Nerve involvement: none  Vascular damage: no  Anesthesia: digital block    Anesthesia:  Local Anesthetic: lidocaine 2% without epinephrine and bupivacaine 0 5% without epinephrine  Anesthetic total: 10 mL    Sedation:  Patient sedated: no      Wound Dehiscence:  Superficial Wound Dehiscence: simple closure      Procedure Details:  Preparation: Patient was prepped and draped in the usual sterile fashion  Irrigation solution: saline  Irrigation method: syringe  Amount of cleaning: standard  Debridement: none  Degree of undermining: none  Skin closure: 5-0 nylon  Number of sutures: 12  Technique: simple  Approximation: close  Approximation difficulty: simple  Dressing: 4x4 sterile gauze  Patient tolerance: patient tolerated the procedure well with no immediate complications               ED Course                             SBIRT 22yo+      Most Recent Value   SBIRT (22 yo +)   In order to provide better care to our patients, we are screening all of our patients for alcohol and drug use   Would it be okay to ask you these screening questions? Unable to answer at this time Filed at: 04/30/2021 2149                    [facility]  Number of Diagnoses or Management Options  Open fracture of tuft of distal phalanx of finger: new and does not require workup  Diagnosis management comments: Patient presented for evaluation of left 4th digit injury after accidental crush injury while moving a refrigerator  Patient was seen by myself and attending  Evidence of distal tuft fracture in the left 4th digit with overlying laceration  I consulted Dr Marilyn Patterson hand surgery who advised suture repair with splint placement in the emergency department with follow-up in the office as well as updated Tdap and antibiotics  Laceration closed by me and splint placed and procedure documented  Well tolerated  No complications  Educated extensively on supportive care and follow-up  Neurovascularly intact following suture placement  Stable for discharge home       Amount and/or Complexity of Data Reviewed  Tests in the radiology section of CPT®: ordered and reviewed  Discuss the patient with other providers: yes (Dr Marilyn Patterson)    Risk of Complications, Morbidity, and/or Mortality  Presenting problems: moderate  Diagnostic procedures: moderate  Management options: moderate    Patient Progress  Patient progress: stable      Disposition  Final diagnoses:   Open fracture of tuft of distal phalanx of finger     Time reflects when diagnosis was documented in both MDM as applicable and the Disposition within this note     Time User Action Codes Description Comment    4/30/2021 10:46 PM Ngozi Leonardo Add [R39 [id number]] Open fracture of tuft of distal phalanx of finger       ED Disposition     ED Disposition Condition Date/Time Comment    Discharge Stable Fri Apr 30, 2021 [address] discharge to home/self care              Follow-up Information     Follow up With Specialties Details Why Contact Info    Kelsey Dong MD Plastic Surgery, Hand Surgery Schedule an appointment as soon as possible for a visit in 2 days For wound re-check [address] [facility]/ [address]            Discharge Medication List as of 4/30/2021 10:51 PM      START taking these medications    Details   cephalexin (KEFLEX) 500 mg capsule Take 1 capsule (500 mg total) by mouth every 8 (eight) hours for 7 days, Starting Fri 4/30/2021, Until Fri 5/7/2021, Normal      !! naproxen (NAPROSYN) 500 mg tablet Take 1 tablet (500 mg total) by mouth 2 (two) times a day with meals, Starting Fri 4/30/2021, Normal       !! - Potential duplicate medications found  Please discuss with provider  CONTINUE these medications which have NOT CHANGED    Details   diclofenac sodium (VOLTAREN) 50 mg EC tablet Take 1 tablet (50 mg total) by mouth 2 (two) times a day, Starting Tue 1/1/2019, Print      !! naproxen (NAPROSYN) 500 mg tablet Take 1 tablet (500 mg total) by mouth 2 (two) times a day with meals, Starting Mon 9/9/2019, Print       !! - Potential duplicate medications found  Please discuss with provider  No discharge procedures on file      PDMP Review     None          ED Provider  Electronically Signed by           Mecry Thornton PA-C  05/01/21 0030

## 2021-09-11 ENCOUNTER — HOSPITAL ENCOUNTER (EMERGENCY)
Facility: HOSPITAL | Age: 56
End: 2021-09-12
Attending: EMERGENCY MEDICINE | Admitting: EMERGENCY MEDICINE
Payer: MEDICARE

## 2021-09-11 ENCOUNTER — HOSPITAL ENCOUNTER (OUTPATIENT)
Facility: HOSPITAL | Age: 56
Setting detail: SURGERY ADMIT
End: 2021-09-11
Attending: PLASTIC SURGERY | Admitting: PLASTIC SURGERY
Payer: MEDICARE

## 2021-09-11 ENCOUNTER — HOSPITAL ENCOUNTER (EMERGENCY)
Facility: HOSPITAL | Age: 56
Discharge: LEFT BEFORE DISCHARGE | End: 2021-09-11
Attending: EMERGENCY MEDICINE
Payer: MEDICARE

## 2021-09-11 ENCOUNTER — APPOINTMENT (EMERGENCY)
Dept: CT IMAGING | Facility: HOSPITAL | Age: 56
End: 2021-09-11
Payer: MEDICARE

## 2021-09-11 VITALS
TEMPERATURE: 99.3 F | SYSTOLIC BLOOD PRESSURE: 182 MMHG | RESPIRATION RATE: 18 BRPM | HEART RATE: 56 BPM | WEIGHT: 145.94 LBS | BODY MASS INDEX: 24.29 KG/M2 | DIASTOLIC BLOOD PRESSURE: 102 MMHG | OXYGEN SATURATION: 99 %

## 2021-09-11 DIAGNOSIS — L02.511 ABSCESS OF RIGHT HAND: ICD-10-CM

## 2021-09-11 DIAGNOSIS — F11.10 HEROIN ABUSE (HCC): ICD-10-CM

## 2021-09-11 DIAGNOSIS — L03.113 CELLULITIS OF RIGHT HAND: Primary | ICD-10-CM

## 2021-09-11 DIAGNOSIS — L08.9 INFECTION OF RIGHT HAND: Primary | ICD-10-CM

## 2021-09-11 DIAGNOSIS — L02.511 ABSCESS OF RIGHT HAND: Primary | ICD-10-CM

## 2021-09-11 DIAGNOSIS — L03.113 CELLULITIS OF RIGHT HAND: ICD-10-CM

## 2021-09-11 LAB
ALBUMIN SERPL BCP-MCNC: 3.6 G/DL (ref 3–5.2)
ALBUMIN SERPL BCP-MCNC: 3.7 G/DL (ref 3–5.2)
ALP SERPL-CCNC: 84 U/L (ref 43–122)
ALP SERPL-CCNC: 89 U/L (ref 43–122)
ALT SERPL W P-5'-P-CCNC: 24 U/L
ALT SERPL W P-5'-P-CCNC: 25 U/L
AMPHETAMINES SERPL QL SCN: NEGATIVE
ANION GAP SERPL CALCULATED.3IONS-SCNC: 6 MMOL/L (ref 5–14)
ANION GAP SERPL CALCULATED.3IONS-SCNC: 7 MMOL/L (ref 5–14)
AST SERPL W P-5'-P-CCNC: 27 U/L (ref 17–59)
AST SERPL W P-5'-P-CCNC: 27 U/L (ref 17–59)
ATRIAL RATE: 54 BPM
BACTERIA UR QL AUTO: NORMAL /HPF
BARBITURATES UR QL: NEGATIVE
BASOPHILS # BLD AUTO: 0 THOUSANDS/ΜL (ref 0–0.1)
BASOPHILS # BLD AUTO: 0 THOUSANDS/ΜL (ref 0–0.1)
BASOPHILS NFR BLD AUTO: 0 % (ref 0–1)
BASOPHILS NFR BLD AUTO: 0 % (ref 0–1)
BENZODIAZ UR QL: NEGATIVE
BILIRUB SERPL-MCNC: 0.58 MG/DL
BILIRUB SERPL-MCNC: 0.66 MG/DL
BILIRUB UR QL STRIP: NEGATIVE
BUN SERPL-MCNC: 10 MG/DL (ref 5–25)
BUN SERPL-MCNC: 9 MG/DL (ref 5–25)
CALCIUM SERPL-MCNC: 8.6 MG/DL (ref 8.4–10.2)
CALCIUM SERPL-MCNC: 8.7 MG/DL (ref 8.4–10.2)
CHLORIDE SERPL-SCNC: 102 MMOL/L (ref 97–108)
CHLORIDE SERPL-SCNC: 102 MMOL/L (ref 97–108)
CLARITY UR: CLEAR
CO2 SERPL-SCNC: 31 MMOL/L (ref 22–30)
CO2 SERPL-SCNC: 32 MMOL/L (ref 22–30)
COCAINE UR QL: POSITIVE
COLOR UR: YELLOW
CREAT SERPL-MCNC: 0.53 MG/DL (ref 0.7–1.5)
CREAT SERPL-MCNC: 0.69 MG/DL (ref 0.7–1.5)
EOSINOPHIL # BLD AUTO: 0 THOUSAND/ΜL (ref 0–0.4)
EOSINOPHIL # BLD AUTO: 0.1 THOUSAND/ΜL (ref 0–0.4)
EOSINOPHIL NFR BLD AUTO: 0 % (ref 0–6)
EOSINOPHIL NFR BLD AUTO: 1 % (ref 0–6)
ERYTHROCYTE [DISTWIDTH] IN BLOOD BY AUTOMATED COUNT: 12.4 %
ERYTHROCYTE [DISTWIDTH] IN BLOOD BY AUTOMATED COUNT: 12.4 %
ETHANOL SERPL-MCNC: <10 MG/DL (ref 0–10)
FLUAV RNA RESP QL NAA+PROBE: NEGATIVE
FLUBV RNA RESP QL NAA+PROBE: NEGATIVE
GFR SERPL CREATININE-BSD FRML MDRD: 105 ML/MIN/1.73SQ M
GFR SERPL CREATININE-BSD FRML MDRD: 117 ML/MIN/1.73SQ M
GLUCOSE SERPL-MCNC: 123 MG/DL (ref 70–99)
GLUCOSE SERPL-MCNC: 144 MG/DL (ref 70–99)
GLUCOSE UR STRIP-MCNC: NEGATIVE MG/DL
HCT VFR BLD AUTO: 35.7 % (ref 41–53)
HCT VFR BLD AUTO: 36.5 % (ref 41–53)
HGB BLD-MCNC: 12.4 G/DL (ref 13.5–17.5)
HGB BLD-MCNC: 12.4 G/DL (ref 13.5–17.5)
HGB UR QL STRIP.AUTO: 10
KETONES UR STRIP-MCNC: NEGATIVE MG/DL
LACTATE SERPL-SCNC: 1.3 MMOL/L (ref 0.7–2)
LACTATE SERPL-SCNC: 1.7 MMOL/L (ref 0.7–2)
LEUKOCYTE ESTERASE UR QL STRIP: NEGATIVE
LIPASE SERPL-CCNC: 23 U/L (ref 23–300)
LYMPHOCYTES # BLD AUTO: 0.8 THOUSANDS/ΜL (ref 0.5–4)
LYMPHOCYTES # BLD AUTO: 1.2 THOUSANDS/ΜL (ref 0.5–4)
LYMPHOCYTES NFR BLD AUTO: 10 % (ref 25–45)
LYMPHOCYTES NFR BLD AUTO: 13 % (ref 25–45)
MCH RBC QN AUTO: 30.4 PG (ref 26–34)
MCH RBC QN AUTO: 31.4 PG (ref 26–34)
MCHC RBC AUTO-ENTMCNC: 33.9 G/DL (ref 31–36)
MCHC RBC AUTO-ENTMCNC: 34.7 G/DL (ref 31–36)
MCV RBC AUTO: 90 FL (ref 80–100)
MCV RBC AUTO: 90 FL (ref 80–100)
METHADONE UR QL: NEGATIVE
MONOCYTES # BLD AUTO: 0.4 THOUSAND/ΜL (ref 0.2–0.9)
MONOCYTES # BLD AUTO: 0.5 THOUSAND/ΜL (ref 0.2–0.9)
MONOCYTES NFR BLD AUTO: 5 % (ref 1–10)
MONOCYTES NFR BLD AUTO: 5 % (ref 1–10)
NEUTROPHILS # BLD AUTO: 7.4 THOUSANDS/ΜL (ref 1.8–7.8)
NEUTROPHILS # BLD AUTO: 7.8 THOUSANDS/ΜL (ref 1.8–7.8)
NEUTS SEG NFR BLD AUTO: 82 % (ref 45–65)
NEUTS SEG NFR BLD AUTO: 84 % (ref 45–65)
NITRITE UR QL STRIP: NEGATIVE
NON-SQ EPI CELLS URNS QL MICRO: NORMAL /HPF
OPIATES UR QL SCN: POSITIVE
OXYCODONE+OXYMORPHONE UR QL SCN: NEGATIVE
P AXIS: 78 DEGREES
PCP UR QL: NEGATIVE
PH UR STRIP.AUTO: 6.5 [PH]
PLATELET # BLD AUTO: 245 THOUSANDS/UL (ref 150–450)
PLATELET # BLD AUTO: 247 THOUSANDS/UL (ref 150–450)
PMV BLD AUTO: 5.9 FL (ref 8.9–12.7)
PMV BLD AUTO: 6.1 FL (ref 8.9–12.7)
POTASSIUM SERPL-SCNC: 3.6 MMOL/L (ref 3.6–5)
POTASSIUM SERPL-SCNC: 3.7 MMOL/L (ref 3.6–5)
PR INTERVAL: 158 MS
PROT SERPL-MCNC: 7.2 G/DL (ref 5.9–8.4)
PROT SERPL-MCNC: 7.3 G/DL (ref 5.9–8.4)
PROT UR STRIP-MCNC: NEGATIVE MG/DL
QRS AXIS: 52 DEGREES
QRSD INTERVAL: 74 MS
QT INTERVAL: 472 MS
QTC INTERVAL: 447 MS
RBC # BLD AUTO: 3.95 MILLION/UL (ref 4.5–5.9)
RBC # BLD AUTO: 4.07 MILLION/UL (ref 4.5–5.9)
RBC #/AREA URNS AUTO: NORMAL /HPF
RSV RNA RESP QL NAA+PROBE: NEGATIVE
SARS-COV-2 RNA RESP QL NAA+PROBE: NEGATIVE
SODIUM SERPL-SCNC: 140 MMOL/L (ref 137–147)
SODIUM SERPL-SCNC: 140 MMOL/L (ref 137–147)
SP GR UR STRIP.AUTO: 1.01 (ref 1–1.04)
T WAVE AXIS: 54 DEGREES
THC UR QL: POSITIVE
UROBILINOGEN UA: NEGATIVE MG/DL
VENTRICULAR RATE: 54 BPM
WBC # BLD AUTO: 8.8 THOUSAND/UL (ref 4.5–11)
WBC # BLD AUTO: 9.5 THOUSAND/UL (ref 4.5–11)
WBC #/AREA URNS AUTO: NORMAL /HPF

## 2021-09-11 PROCEDURE — 96365 THER/PROPH/DIAG IV INF INIT: CPT

## 2021-09-11 PROCEDURE — 99284 EMERGENCY DEPT VISIT MOD MDM: CPT

## 2021-09-11 PROCEDURE — 99285 EMERGENCY DEPT VISIT HI MDM: CPT | Performed by: PHYSICIAN ASSISTANT

## 2021-09-11 PROCEDURE — 73201 CT UPPER EXTREMITY W/DYE: CPT

## 2021-09-11 PROCEDURE — 83605 ASSAY OF LACTIC ACID: CPT | Performed by: PHYSICIAN ASSISTANT

## 2021-09-11 PROCEDURE — 96361 HYDRATE IV INFUSION ADD-ON: CPT

## 2021-09-11 PROCEDURE — 0241U HB NFCT DS VIR RESP RNA 4 TRGT: CPT | Performed by: PHYSICIAN ASSISTANT

## 2021-09-11 PROCEDURE — 83690 ASSAY OF LIPASE: CPT | Performed by: PHYSICIAN ASSISTANT

## 2021-09-11 PROCEDURE — 85025 COMPLETE CBC W/AUTO DIFF WBC: CPT | Performed by: PHYSICIAN ASSISTANT

## 2021-09-11 PROCEDURE — 93010 ELECTROCARDIOGRAM REPORT: CPT | Performed by: INTERNAL MEDICINE

## 2021-09-11 PROCEDURE — 82077 ASSAY SPEC XCP UR&BREATH IA: CPT | Performed by: PHYSICIAN ASSISTANT

## 2021-09-11 PROCEDURE — 99284 EMERGENCY DEPT VISIT MOD MDM: CPT | Performed by: PHYSICIAN ASSISTANT

## 2021-09-11 PROCEDURE — 96360 HYDRATION IV INFUSION INIT: CPT

## 2021-09-11 PROCEDURE — 87040 BLOOD CULTURE FOR BACTERIA: CPT | Performed by: PHYSICIAN ASSISTANT

## 2021-09-11 PROCEDURE — 80053 COMPREHEN METABOLIC PANEL: CPT | Performed by: PHYSICIAN ASSISTANT

## 2021-09-11 PROCEDURE — 80307 DRUG TEST PRSMV CHEM ANLYZR: CPT | Performed by: PHYSICIAN ASSISTANT

## 2021-09-11 PROCEDURE — 81001 URINALYSIS AUTO W/SCOPE: CPT | Performed by: PHYSICIAN ASSISTANT

## 2021-09-11 PROCEDURE — 93005 ELECTROCARDIOGRAM TRACING: CPT

## 2021-09-11 PROCEDURE — 36415 COLL VENOUS BLD VENIPUNCTURE: CPT | Performed by: PHYSICIAN ASSISTANT

## 2021-09-11 PROCEDURE — 96375 TX/PRO/DX INJ NEW DRUG ADDON: CPT

## 2021-09-11 RX ORDER — CLINDAMYCIN HYDROCHLORIDE 150 MG/1
300 CAPSULE ORAL ONCE
Status: COMPLETED | OUTPATIENT
Start: 2021-09-11 | End: 2021-09-11

## 2021-09-11 RX ORDER — CLINDAMYCIN PHOSPHATE 600 MG/50ML
600 INJECTION INTRAVENOUS ONCE
Status: COMPLETED | OUTPATIENT
Start: 2021-09-11 | End: 2021-09-11

## 2021-09-11 RX ORDER — SODIUM CHLORIDE 9 MG/ML
250 INJECTION, SOLUTION INTRAVENOUS CONTINUOUS
Status: DISCONTINUED | OUTPATIENT
Start: 2021-09-11 | End: 2021-09-11 | Stop reason: HOSPADM

## 2021-09-11 RX ORDER — CLINDAMYCIN HYDROCHLORIDE 150 MG/1
300 CAPSULE ORAL EVERY 8 HOURS SCHEDULED
Status: DISCONTINUED | OUTPATIENT
Start: 2021-09-11 | End: 2021-09-11 | Stop reason: HOSPADM

## 2021-09-11 RX ORDER — NALOXONE HYDROCHLORIDE 1 MG/ML
4 INJECTION INTRAMUSCULAR; INTRAVENOUS; SUBCUTANEOUS ONCE
Status: COMPLETED | OUTPATIENT
Start: 2021-09-11 | End: 2021-09-11

## 2021-09-11 RX ORDER — ACETAMINOPHEN 325 MG/1
650 TABLET ORAL EVERY 8 HOURS PRN
Status: DISCONTINUED | OUTPATIENT
Start: 2021-09-11 | End: 2021-09-11 | Stop reason: HOSPADM

## 2021-09-11 RX ORDER — ONDANSETRON 2 MG/ML
4 INJECTION INTRAMUSCULAR; INTRAVENOUS ONCE
Status: COMPLETED | OUTPATIENT
Start: 2021-09-11 | End: 2021-09-11

## 2021-09-11 RX ADMIN — CLINDAMYCIN HYDROCHLORIDE 300 MG: 150 CAPSULE ORAL at 12:24

## 2021-09-11 RX ADMIN — NALOXONE HYDROCHLORIDE 4 MG: 1 INJECTION INTRAMUSCULAR; INTRAVENOUS; SUBCUTANEOUS at 21:45

## 2021-09-11 RX ADMIN — SODIUM CHLORIDE 1000 ML: 0.9 INJECTION, SOLUTION INTRAVENOUS at 21:45

## 2021-09-11 RX ADMIN — IOHEXOL 100 ML: 350 INJECTION, SOLUTION INTRAVENOUS at 13:39

## 2021-09-11 RX ADMIN — SODIUM CHLORIDE 250 ML/HR: 0.9 INJECTION, SOLUTION INTRAVENOUS at 12:28

## 2021-09-11 RX ADMIN — ONDANSETRON 4 MG: 2 INJECTION INTRAMUSCULAR; INTRAVENOUS at 21:45

## 2021-09-11 RX ADMIN — CLINDAMYCIN IN 5 PERCENT DEXTROSE 600 MG: 12 INJECTION, SOLUTION INTRAVENOUS at 22:04

## 2021-09-11 NOTE — EMTALA/ACUTE CARE TRANSFER
[facility] EMERGENCY DEPARTMENT  [address] [facility] [address]  [phone]  Dept: [phone]      EMTALA TRANSFER CONSENT    NAME Elidia Joshua                                         1965                              MRN [id number]    I have been informed of my rights regarding examination, treatment, and transfer   by Dr Justin Echeverria, DO    Benefits:      Risks:        Consent for Transfer:  I acknowledge that my medical condition has been evaluated and explained to me by the emergency department physician or other qualified medical person and/or my attending physician, who has recommended that I be transferred to the service of    at    The above potential benefits of such transfer, the potential risks associated with such transfer, and the probable risks of not being transferred have been explained to me, and I fully understand them  The doctor has explained that, in my case, the benefits of transfer outweigh the risks  I agree to be transferred  I authorize the performance of emergency medical procedures and treatments upon me in both transit and upon arrival at the receiving facility  Additionally, I authorize the release of any and all medical records to the receiving facility and request they be transported with me, if possible  I understand that the safest mode of transportation during a medical emergency is an ambulance and that the Hospital advocates the use of this mode of transport  Risks of traveling to the receiving facility by car, including absence of medical control, life sustaining equipment, such as oxygen, and medical personnel has been explained to me and I fully understand them  (JEROD CORRECT BOX BELOW)  [  ]  I consent to the stated transfer and to be transported by ambulance/helicopter  [  ]  I consent to the stated transfer, but refuse transportation by ambulance and accept full responsibility for my transportation by car    I understand the risks of non-ambulance transfers and I exonerate the Hospital and its staff from any deterioration in my condition that results from this refusal     X___________________________________________    DATE  21  TIME________  Signature of patient or legally responsible individual signing on patient behalf           RELATIONSHIP TO PATIENT_________________________          Provider Certification    NAME Chago Bowden                                        [facility] 1965                              MRN [id number]    A medical screening exam was performed on the above named patient  Based on the examination:    Condition Necessitating Transfer The encounter diagnosis was Abscess of right hand  Patient Condition:      Reason for Transfer:      Transfer Requirements: Facility     · Space available and qualified personnel available for treatment as acknowledged by    · Agreed to accept transfer and to provide appropriate medical treatment as acknowledged by          · Appropriate medical records of the examination and treatment of the patient are provided at the time of transfer   [address] [facility], [address] _______  · Transfer will be performed by qualified personnel from    and appropriate transfer equipment as required, including the use of necessary and appropriate life support measures      Provider Certification: I have examined the patient and explained the following risks and benefits of being transferred/refusing transfer to the patient/family:         Based on these reasonable risks and benefits to the patient and/or the unborn child(sushma), and based upon the information available at the time of the patients examination, I certify that the medical benefits reasonably to be expected from the provision of appropriate medical treatments at another medical facility outweigh the increasing risks, if any, to the individuals medical condition, and in the case of labor to the unborn child, from effecting the transfer      X____________________________________________ DATE 09/11/21        TIME_______      ORIGINAL - SEND TO MEDICAL RECORDS   COPY - SEND WITH PATIENT DURING TRANSFER

## 2021-09-11 NOTE — ED PROVIDER NOTES
History  Chief Complaint   Patient presents with    Hand Swelling     Pt presents with major edema and swelling in his right hand, has full sensation, pulses present  Pt states it has been like this for 2 days, admits to IV drug use in his right wrist       Pt with right hand swelling x 2 days,  Pt admits iv heroin           Prior to Admission Medications   Prescriptions Last Dose Informant Patient Reported? Taking?   diclofenac sodium (VOLTAREN) 50 mg EC tablet Not Taking at Unknown time  No No   Sig: Take 1 tablet (50 mg total) by mouth 2 (two) times a day   Patient not taking: Reported on 9/11/2021   naproxen (NAPROSYN) 500 mg tablet Not Taking at Unknown time  No No   Sig: Take 1 tablet (500 mg total) by mouth 2 (two) times a day with meals   Patient not taking: Reported on 9/11/2021      Facility-Administered Medications: None       Past Medical History:   Diagnosis Date    Asthma        History reviewed  No pertinent surgical history  History reviewed  No pertinent family history  I have reviewed and agree with the history as documented  E-Cigarette/Vaping    E-Cigarette Use Never User      E-Cigarette/Vaping Substances     Social History     Tobacco Use    Smoking status: Current Every Day Smoker     Packs/day: 1 00    Smokeless tobacco: Never Used   Vaping Use    Vaping Use: Never used   Substance Use Topics    Alcohol use: Never    Drug use: Yes     Types: Marijuana, Heroin     Comment: States he "uses about everything"       Review of Systems   Constitutional: Negative  HENT: Negative  Eyes: Negative  Respiratory: Negative  Cardiovascular: Negative  Gastrointestinal: Negative  Endocrine: Negative  Genitourinary: Negative  Musculoskeletal: Negative  Skin: Negative  Allergic/Immunologic: Negative  Neurological: Negative  Hematological: Negative  Psychiatric/Behavioral: Negative  All other systems reviewed and are negative        Physical Exam  Physical Exam  Vitals and nursing note reviewed  Constitutional:       Appearance: Normal appearance  He is normal weight  Comments: Pt is left handed     Pt to Dr Vida Palomo internist and Dr Michael Flores for surgery in the morning     545pm  Pt eloped from er    HENT:      Head: Normocephalic and atraumatic  Right Ear: Tympanic membrane, ear canal and external ear normal       Left Ear: Tympanic membrane, ear canal and external ear normal       Nose: Nose normal       Mouth/Throat:      Mouth: Mucous membranes are dry  Pharynx: Oropharynx is clear  Eyes:      Extraocular Movements: Extraocular movements intact  Conjunctiva/sclera: Conjunctivae normal       Pupils: Pupils are equal, round, and reactive to light  Cardiovascular:      Rate and Rhythm: Normal rate  Pulses: Normal pulses  Heart sounds: Normal heart sounds  Pulmonary:      Effort: Pulmonary effort is normal       Breath sounds: Normal breath sounds  Abdominal:      General: Abdomen is flat  Bowel sounds are normal       Palpations: Abdomen is soft  Musculoskeletal:      Cervical back: Normal range of motion and neck supple  Comments: Right hand dorsum swelling signigigant  Fingers unifomed swelling all fingers  Reduced rom all finger finger tip sensation intact thumb uniform swelling  Reduced rom   Right wrist pain  From with swelling radial pulse strong  Forearm swelling    Skin:     General: Skin is warm  Capillary Refill: Capillary refill takes less than 2 seconds  Neurological:      General: No focal deficit present  Mental Status: He is alert and oriented to person, place, and time     Psychiatric:         Mood and Affect: Mood normal          Behavior: Behavior normal          Vital Signs  ED Triage Vitals [09/11/21 1105]   Temperature Pulse Respirations Blood Pressure SpO2   99 3 °F (37 4 °C) 86 18 149/84 99 %      Temp Source Heart Rate Source Patient Position - Orthostatic VS BP Location FiO2 (%)   Oral Monitor Sitting Left arm --      Pain Score       --           Vitals:    09/11/21 1105 09/11/21 1314 09/11/21 1641   BP: 149/84 143/86 (!) 182/102   Pulse: 86 (!) 50 56   Patient Position - Orthostatic VS: Sitting Lying Sitting         Visual Acuity      ED Medications  Medications   sodium chloride 0 9 % infusion (250 mL/hr Intravenous New Bag 9/11/21 1228)   clindamycin (CLEOCIN) capsule 300 mg (has no administration in time range)   acetaminophen (TYLENOL) tablet 650 mg (has no administration in time range)   clindamycin (CLEOCIN) capsule 300 mg (300 mg Oral Given 9/11/21 1224)   iohexol (OMNIPAQUE) 350 MG/ML injection (SINGLE-DOSE) 100 mL (100 mL Intravenous Given 9/11/21 1339)       Diagnostic Studies  Results Reviewed     Procedure Component Value Units Date/Time    Urine Microscopic [[id number]]  (Normal) Collected: 09/11/21 1400    Lab Status: Final result Specimen: Urine, Other Updated: 09/11/21 1438     RBC, UA None Seen /hpf      WBC, UA None Seen /hpf      Epithelial Cells Occasional /hpf      Bacteria, UA None Seen /hpf     Rapid drug screen, urine [[id number]]  (Abnormal) Collected: 09/11/21 1359    Lab Status: Final result Specimen: Urine, Other Updated: 09/11/21 1436     Amph/Meth UR Negative     Barbiturate Ur Negative     Benzodiazepine Urine Negative     Cocaine Urine Positive     Methadone Urine Negative     Opiate Urine Positive     PCP Ur Negative     THC Urine Positive     Oxycodone Urine Negative    Narrative:      Presumptive report  If requested, specimen will be sent to reference lab for confirmation  FOR MEDICAL PURPOSES ONLY  IF CONFIRMATION NEEDED PLEASE CONTACT THE LAB WITHIN 5 DAYS      Drug Screen Cutoff Levels:  AMPHETAMINE/METHAMPHETAMINES  1000 ng/mL  BARBITURATES     200 ng/mL  BENZODIAZEPINES     200 ng/mL  COCAINE      300 ng/mL  METHADONE      300 ng/mL  OPIATES      300 ng/mL  PHENCYCLIDINE     25 ng/mL  THC       50 ng/mL  OXYCODONE      100 ng/mL    Ethanol [[id number]] (Normal) Collected: 09/11/21 1359    Lab Status: Final result Specimen: Blood from Arm, Left Updated: 09/11/21 1415     Ethanol Lvl <10 mg/dL     UA w Reflex to Microscopic w Reflex to Culture [[id number]]  (Abnormal) Collected: 09/11/21 1400    Lab Status: Final result Specimen: Urine, Other Updated: 09/11/21 1412     Color, UA Yellow     Clarity, UA Clear     Specific Gravity, UA 1 010     pH, UA 6 5     Leukocytes, UA Negative     Nitrite, UA Negative     Protein, UA Negative mg/dl      Glucose, UA Negative mg/dl      Ketones, UA Negative mg/dl      Bilirubin, UA Negative     Blood, UA 10 0     UROBILINOGEN UA Negative mg/dL     Blood culture [[id number]] Collected: 09/11/21 1223    Lab Status:  In process Specimen: Blood from Arm, Right Updated: 09/11/21 1233    Lipase [[id number]]  (Normal) Collected: 09/11/21 1207    Lab Status: Final result Specimen: Blood from Arm, Left Updated: 09/11/21 1231     Lipase 23 u/L     Comprehensive metabolic panel [[id number]]  (Abnormal) Collected: 09/11/21 1207    Lab Status: Final result Specimen: Blood from Arm, Left Updated: 09/11/21 1231     Sodium 140 mmol/L      Potassium 3 7 mmol/L      Chloride 102 mmol/L      CO2 32 mmol/L      ANION GAP 6 mmol/L      BUN 9 mg/dL      Creatinine 0 53 mg/dL      Glucose 123 mg/dL      Calcium 8 6 mg/dL      AST 27 U/L      ALT 25 U/L      Alkaline Phosphatase 89 U/L      Total Protein 7 2 g/dL      Albumin 3 6 g/dL      Total Bilirubin 0 58 mg/dL      eGFR 117 ml/min/1 73sq m     Narrative:      [place] guidelines for Chronic Kidney Disease (CKD):     Stage 1 with normal or high GFR (GFR > 90 mL/min/1 73 square meters)    Stage 2 Mild CKD (GFR = 60-89 mL/min/1 73 square meters)    Stage 3A Moderate CKD (GFR = 45-59 mL/min/1 73 square meters)    Stage 3B Moderate CKD (GFR = 30-44 mL/min/1 73 square meters)    Stage 4 Severe CKD (GFR = 15-29 mL/min/1 73 square meters)    Stage 5 End Stage CKD (GFR <15 mL/min/1 73 square meters)  Note: GFR calculation is accurate only with a steady state creatinine    Lactic acid, plasma [[id number]]  (Normal) Collected: 09/11/21 1207    Lab Status: Final result Specimen: Blood from Arm, Left Updated: 09/11/21 1227     LACTIC ACID 1 3 mmol/L     Narrative:      Result may be elevated if tourniquet was used during collection  CBC and differential [[id number]]  (Abnormal) Collected: 09/11/21 1207    Lab Status: Final result Specimen: Blood from Arm, Left Updated: 09/11/21 1219     WBC 8 80 Thousand/uL      RBC 4 07 Million/uL      Hemoglobin 12 4 g/dL      Hematocrit 36 5 %      MCV 90 fL      MCH 30 4 pg      MCHC 33 9 g/dL      RDW 12 4 %      MPV 5 9 fL      Platelets 504 Thousands/uL      Neutrophils Relative 84 %      Lymphocytes Relative 10 %      Monocytes Relative 5 %      Eosinophils Relative 1 %      Basophils Relative 0 %      Neutrophils Absolute 7 40 Thousands/µL      Lymphocytes Absolute 0 80 Thousands/µL      Monocytes Absolute 0 40 Thousand/µL      Eosinophils Absolute 0 10 Thousand/µL      Basophils Absolute 0 00 Thousands/µL     Blood culture [[id number]] Collected: 09/11/21 1207    Lab Status: In process Specimen: Blood from Arm, Left Updated: 09/11/21 1212                 CT upper extremity w contrast right   Final Result by Juan J Melendez MD (09/11 1359)      There is a 3 1 x 1 9 x 3 6 cm subcutaneous fluid collection over the dorsum of the right hand, overlying the 2nd through 4th metacarpals, consistent with abscess given history (series 5 images 55-71 )        There is additional subcutaneous edema throughout the right hand, wrist, and forearm consistent with cellulitis              Workstation performed: [id number]                    Procedures  Procedures         ED Course                             SBIRT 20yo+      Most Recent Value   SBIRT (22 yo +)   In order to provide better care to our patients, we are screening all of our patients for alcohol and drug use  Would it be okay to ask you these screening questions? No Filed at: 09/11/2021 1110   [place]: How many times in the past year have you    Used an illegal drug or used a prescription medication for non-medical reasons? Daily or Almost Daily Filed at: 09/11/2021 1110                    MDM    Disposition  Final diagnoses:   Abscess of right hand   Cellulitis of right hand   Heroin abuse ([address] )     Time reflects when diagnosis was documented in both MDM as applicable and the Disposition within this note     Time User Action Codes Description Comment    9/11/2021 12:55 PM Carlin White  Add [L02 511] Abscess of right hand     9/11/2021  2:01 PM Devan Hanley Add [H66 620] Cellulitis of right hand     9/11/2021  2:01 PM Devan Hanley Add [F11 10] Heroin abuse [facility])       ED Disposition     ED Disposition Condition Date/Time Comment    Eloped  Sat Sep 11, 2021  5:55 PM Suzie Cardona should be transferred out to [place]         Follow-up Information    None         Patient's Medications   Discharge Prescriptions    No medications on file     No discharge procedures on file      PDMP Review     None          ED Provider  Electronically Signed by           Nasra Caro PA-C  09/11/21 5494

## 2021-09-12 ENCOUNTER — HOSPITAL ENCOUNTER (INPATIENT)
Facility: HOSPITAL | Age: 56
LOS: 1 days | Discharge: LEFT BEFORE DISCHARGE | DRG: 383 | End: 2021-09-14
Attending: PLASTIC SURGERY | Admitting: INTERNAL MEDICINE
Payer: MEDICARE

## 2021-09-12 ENCOUNTER — ANESTHESIA EVENT (OUTPATIENT)
Dept: PERIOP | Facility: HOSPITAL | Age: 56
DRG: 383 | End: 2021-09-12
Payer: MEDICARE

## 2021-09-12 ENCOUNTER — ANESTHESIA (OUTPATIENT)
Dept: PERIOP | Facility: HOSPITAL | Age: 56
DRG: 383 | End: 2021-09-12
Payer: MEDICARE

## 2021-09-12 VITALS
DIASTOLIC BLOOD PRESSURE: 64 MMHG | SYSTOLIC BLOOD PRESSURE: 114 MMHG | HEART RATE: 47 BPM | TEMPERATURE: 97.2 F | BODY MASS INDEX: 24.63 KG/M2 | OXYGEN SATURATION: 97 % | RESPIRATION RATE: 16 BRPM | WEIGHT: 148 LBS

## 2021-09-12 PROBLEM — Z72.0 TOBACCO ABUSE: Status: ACTIVE | Noted: 2021-09-12

## 2021-09-12 PROBLEM — L03.119 CELLULITIS AND ABSCESS OF HAND: Status: ACTIVE | Noted: 2021-09-12

## 2021-09-12 PROBLEM — L02.519 CELLULITIS AND ABSCESS OF HAND: Status: ACTIVE | Noted: 2021-09-12

## 2021-09-12 PROBLEM — F19.10 IV DRUG ABUSE (HCC): Status: ACTIVE | Noted: 2021-09-12

## 2021-09-12 PROCEDURE — 87205 SMEAR GRAM STAIN: CPT | Performed by: PLASTIC SURGERY

## 2021-09-12 PROCEDURE — 87102 FUNGUS ISOLATION CULTURE: CPT | Performed by: PLASTIC SURGERY

## 2021-09-12 PROCEDURE — 0J9J0ZX DRAINAGE OF RIGHT HAND SUBCUTANEOUS TISSUE AND FASCIA, OPEN APPROACH, DIAGNOSTIC: ICD-10-PCS | Performed by: PLASTIC SURGERY

## 2021-09-12 PROCEDURE — 87070 CULTURE OTHR SPECIMN AEROBIC: CPT | Performed by: PLASTIC SURGERY

## 2021-09-12 PROCEDURE — 99242 OFF/OP CONSLTJ NEW/EST SF 20: CPT | Performed by: PLASTIC SURGERY

## 2021-09-12 PROCEDURE — 10061 I&D ABSCESS COMP/MULTIPLE: CPT | Performed by: PLASTIC SURGERY

## 2021-09-12 PROCEDURE — 87206 SMEAR FLUORESCENT/ACID STAI: CPT | Performed by: PLASTIC SURGERY

## 2021-09-12 PROCEDURE — 87181 SC STD AGAR DILUTION PER AGT: CPT | Performed by: PLASTIC SURGERY

## 2021-09-12 PROCEDURE — 87116 MYCOBACTERIA CULTURE: CPT | Performed by: PLASTIC SURGERY

## 2021-09-12 PROCEDURE — G0379 DIRECT REFER HOSPITAL OBSERV: HCPCS

## 2021-09-12 PROCEDURE — 87075 CULTR BACTERIA EXCEPT BLOOD: CPT | Performed by: PLASTIC SURGERY

## 2021-09-12 PROCEDURE — 87077 CULTURE AEROBIC IDENTIFY: CPT | Performed by: PLASTIC SURGERY

## 2021-09-12 RX ORDER — IBUPROFEN 600 MG/1
600 TABLET ORAL EVERY 6 HOURS
Status: DISCONTINUED | OUTPATIENT
Start: 2021-09-12 | End: 2021-09-14 | Stop reason: HOSPADM

## 2021-09-12 RX ORDER — MIDAZOLAM HYDROCHLORIDE 2 MG/2ML
INJECTION, SOLUTION INTRAMUSCULAR; INTRAVENOUS AS NEEDED
Status: DISCONTINUED | OUTPATIENT
Start: 2021-09-12 | End: 2021-09-12

## 2021-09-12 RX ORDER — PROPOFOL 10 MG/ML
INJECTION, EMULSION INTRAVENOUS AS NEEDED
Status: DISCONTINUED | OUTPATIENT
Start: 2021-09-12 | End: 2021-09-12

## 2021-09-12 RX ORDER — SUCCINYLCHOLINE/SOD CL,ISO/PF 100 MG/5ML
SYRINGE (ML) INTRAVENOUS AS NEEDED
Status: DISCONTINUED | OUTPATIENT
Start: 2021-09-12 | End: 2021-09-12

## 2021-09-12 RX ORDER — SODIUM CHLORIDE, SODIUM LACTATE, POTASSIUM CHLORIDE, CALCIUM CHLORIDE 600; 310; 30; 20 MG/100ML; MG/100ML; MG/100ML; MG/100ML
20 INJECTION, SOLUTION INTRAVENOUS CONTINUOUS
Status: DISCONTINUED | OUTPATIENT
Start: 2021-09-12 | End: 2021-09-12

## 2021-09-12 RX ORDER — CEFAZOLIN SODIUM 1 G/50ML
1000 SOLUTION INTRAVENOUS EVERY 8 HOURS
Status: DISCONTINUED | OUTPATIENT
Start: 2021-09-12 | End: 2021-09-13

## 2021-09-12 RX ORDER — HYDROMORPHONE HCL 110MG/55ML
PATIENT CONTROLLED ANALGESIA SYRINGE INTRAVENOUS AS NEEDED
Status: DISCONTINUED | OUTPATIENT
Start: 2021-09-12 | End: 2021-09-12

## 2021-09-12 RX ORDER — ONDANSETRON 4 MG/1
4 TABLET, ORALLY DISINTEGRATING ORAL EVERY 6 HOURS PRN
Status: DISCONTINUED | OUTPATIENT
Start: 2021-09-12 | End: 2021-09-14 | Stop reason: HOSPADM

## 2021-09-12 RX ORDER — SODIUM CHLORIDE, SODIUM LACTATE, POTASSIUM CHLORIDE, CALCIUM CHLORIDE 600; 310; 30; 20 MG/100ML; MG/100ML; MG/100ML; MG/100ML
INJECTION, SOLUTION INTRAVENOUS CONTINUOUS PRN
Status: DISCONTINUED | OUTPATIENT
Start: 2021-09-12 | End: 2021-09-12

## 2021-09-12 RX ORDER — FENTANYL CITRATE 50 UG/ML
INJECTION, SOLUTION INTRAMUSCULAR; INTRAVENOUS AS NEEDED
Status: DISCONTINUED | OUTPATIENT
Start: 2021-09-12 | End: 2021-09-12

## 2021-09-12 RX ORDER — HEPARIN SODIUM 5000 [USP'U]/ML
5000 INJECTION, SOLUTION INTRAVENOUS; SUBCUTANEOUS EVERY 8 HOURS SCHEDULED
Status: DISCONTINUED | OUTPATIENT
Start: 2021-09-12 | End: 2021-09-14 | Stop reason: HOSPADM

## 2021-09-12 RX ORDER — HYDROMORPHONE HCL/PF 1 MG/ML
1 SYRINGE (ML) INJECTION
Status: COMPLETED | OUTPATIENT
Start: 2021-09-12 | End: 2021-09-12

## 2021-09-12 RX ORDER — NAPROXEN 500 MG/1
500 TABLET ORAL 2 TIMES DAILY WITH MEALS
Status: DISCONTINUED | OUTPATIENT
Start: 2021-09-12 | End: 2021-09-12

## 2021-09-12 RX ORDER — DEXMEDETOMIDINE HYDROCHLORIDE 100 UG/ML
INJECTION, SOLUTION INTRAVENOUS AS NEEDED
Status: DISCONTINUED | OUTPATIENT
Start: 2021-09-12 | End: 2021-09-12

## 2021-09-12 RX ORDER — ACETAMINOPHEN 325 MG/1
975 TABLET ORAL EVERY 8 HOURS
Status: DISCONTINUED | OUTPATIENT
Start: 2021-09-12 | End: 2021-09-14 | Stop reason: HOSPADM

## 2021-09-12 RX ORDER — EPHEDRINE SULFATE 50 MG/ML
INJECTION INTRAVENOUS AS NEEDED
Status: DISCONTINUED | OUTPATIENT
Start: 2021-09-12 | End: 2021-09-12

## 2021-09-12 RX ORDER — BUPIVACAINE HYDROCHLORIDE AND EPINEPHRINE 2.5; 5 MG/ML; UG/ML
INJECTION, SOLUTION INFILTRATION; PERINEURAL AS NEEDED
Status: DISCONTINUED | OUTPATIENT
Start: 2021-09-12 | End: 2021-09-12 | Stop reason: HOSPADM

## 2021-09-12 RX ORDER — CEFAZOLIN SODIUM 1 G/3ML
INJECTION, POWDER, FOR SOLUTION INTRAMUSCULAR; INTRAVENOUS AS NEEDED
Status: DISCONTINUED | OUTPATIENT
Start: 2021-09-12 | End: 2021-09-12

## 2021-09-12 RX ORDER — SODIUM CHLORIDE, SODIUM LACTATE, POTASSIUM CHLORIDE, CALCIUM CHLORIDE 600; 310; 30; 20 MG/100ML; MG/100ML; MG/100ML; MG/100ML
75 INJECTION, SOLUTION INTRAVENOUS CONTINUOUS
Status: DISCONTINUED | OUTPATIENT
Start: 2021-09-12 | End: 2021-09-13

## 2021-09-12 RX ADMIN — ACETAMINOPHEN 975 MG: 325 TABLET, FILM COATED ORAL at 17:02

## 2021-09-12 RX ADMIN — DEXMEDETOMIDINE 20 MCG: 100 INJECTION, SOLUTION, CONCENTRATE INTRAVENOUS at 14:16

## 2021-09-12 RX ADMIN — CEFAZOLIN 200 MG: 1 INJECTION, POWDER, FOR SOLUTION INTRAMUSCULAR; INTRAVENOUS at 14:33

## 2021-09-12 RX ADMIN — PROPOFOL 200 MG: 10 INJECTION, EMULSION INTRAVENOUS at 14:15

## 2021-09-12 RX ADMIN — NAPROXEN 500 MG: 500 TABLET ORAL at 20:17

## 2021-09-12 RX ADMIN — HYDROMORPHONE HYDROCHLORIDE 1 MG: 1 INJECTION, SOLUTION INTRAMUSCULAR; INTRAVENOUS; SUBCUTANEOUS at 15:15

## 2021-09-12 RX ADMIN — EPHEDRINE SULFATE 10 MG: 50 INJECTION, SOLUTION INTRAVENOUS at 14:29

## 2021-09-12 RX ADMIN — IBUPROFEN 600 MG: 600 TABLET, FILM COATED ORAL at 23:56

## 2021-09-12 RX ADMIN — SODIUM CHLORIDE, SODIUM LACTATE, POTASSIUM CHLORIDE, AND CALCIUM CHLORIDE 75 ML/HR: .6; .31; .03; .02 INJECTION, SOLUTION INTRAVENOUS at 23:13

## 2021-09-12 RX ADMIN — FENTANYL CITRATE 100 MCG: 50 INJECTION INTRAMUSCULAR; INTRAVENOUS at 14:15

## 2021-09-12 RX ADMIN — MIDAZOLAM 2 MG: 1 INJECTION INTRAMUSCULAR; INTRAVENOUS at 14:15

## 2021-09-12 RX ADMIN — HYDROMORPHONE HYDROCHLORIDE 1 MG: 1 INJECTION, SOLUTION INTRAMUSCULAR; INTRAVENOUS; SUBCUTANEOUS at 15:05

## 2021-09-12 RX ADMIN — VANCOMYCIN HYDROCHLORIDE 1250 MG: 10 INJECTION, POWDER, LYOPHILIZED, FOR SOLUTION INTRAVENOUS at 23:11

## 2021-09-12 RX ADMIN — HYDROMORPHONE HYDROCHLORIDE 1 MG: 1 INJECTION, SOLUTION INTRAMUSCULAR; INTRAVENOUS; SUBCUTANEOUS at 15:22

## 2021-09-12 RX ADMIN — HYDROMORPHONE HYDROCHLORIDE 1 MG: 1 INJECTION, SOLUTION INTRAMUSCULAR; INTRAVENOUS; SUBCUTANEOUS at 15:10

## 2021-09-12 RX ADMIN — SODIUM CHLORIDE, SODIUM LACTATE, POTASSIUM CHLORIDE, AND CALCIUM CHLORIDE: .6; .31; .03; .02 INJECTION, SOLUTION INTRAVENOUS at 14:08

## 2021-09-12 RX ADMIN — DEXMEDETOMIDINE 12 MCG: 100 INJECTION, SOLUTION, CONCENTRATE INTRAVENOUS at 14:52

## 2021-09-12 RX ADMIN — HYDROMORPHONE HYDROCHLORIDE 1 MG: 2 INJECTION, SOLUTION INTRAMUSCULAR; INTRAVENOUS; SUBCUTANEOUS at 14:53

## 2021-09-12 RX ADMIN — IBUPROFEN 600 MG: 600 TABLET, FILM COATED ORAL at 17:01

## 2021-09-12 RX ADMIN — Medication 100 MG: at 14:16

## 2021-09-12 NOTE — CONSULTS
Plastic/Hand Surgery Consult - Deborah Older 61 y o  male MRN: [id number]    Unit/Bed#: [facility] [place] Encounter: [id number]      Assessment:  Impression: 1) Right hand abscess secondary to IV drug use causing extending cellulitis  I discussed that it is my medical recommendation for him to undergo I and D in order to obtain source control and culture for target therapy  Informed consent was obtained    Plan:  GI: NPO after midnight  Wound Care: Keep  right upper extremity elevated above level of heart  ID: Empiric abx started  Ambulation/ Mobility Protocol: weight- bearing:  FWB/WBAT  DVT Prophylaxis: Sequential compression device Adriana Webb   Consults: Infectious Disease    Otto Scheuermann MD   [facility]   [address] [facility], [address]    Office: [phone]        HPI:  This is a 61 y o  left handed male who presents with 3 days history of swelling and redness over dorsum of right hand that has progressively gotten worse causing pain and discomfort  Patient has history of IV drug use, heroin, he was seen in ED yesterday and recommended admission for therapy but left AMA for another round of heroin use  He re-presented and is now admitted for care  Hand was asked to evaluate and recommend  Consults    Historical Information   Past Medical History:   Diagnosis Date    Asthma      No past surgical history on file    Social History   Social History     Substance and Sexual Activity   Alcohol Use Never     Social History     Substance and Sexual Activity   Drug Use Yes    Types: Marijuana, Heroin, Cocaine    Comment: States he "uses about everything"     Social History     Tobacco Use   Smoking Status Current Every Day Smoker    Packs/day: 1 00   Smokeless Tobacco Never Used     Family History: non-contributory    Meds/Allergies   all current active meds have been reviewed  Allergies   Allergen Reactions    Vicodin [Hydrocodone-Acetaminophen] Vitals: There were no vitals taken for this visit      Results:   Recent Results (from the past 24 hour(s))   Ethanol    Collection Time: 09/11/21  1:59 PM   Result Value Ref Range    Ethanol Lvl <10 0 - 10 mg/dL   Rapid drug screen, urine    Collection Time: 09/11/21  1:59 PM   Result Value Ref Range    Amph/Meth UR Negative Negative    Barbiturate Ur Negative Negative    Benzodiazepine Urine Negative Negative    Cocaine Urine Positive (A) Negative    Methadone Urine Negative Negative    Opiate Urine Positive (A) Negative    PCP Ur Negative Negative    THC Urine Positive (A) Negative    Oxycodone Urine Negative Negative   UA w Reflex to Microscopic w Reflex to Culture    Collection Time: 09/11/21  2:00 PM    Specimen: Urine, Other   Result Value Ref Range    Color, UA Yellow Straw, Yellow, Pale Yellow    Clarity, UA Clear Clear, Other    Specific [place], UA 1 010 1 003 - 1 040    pH, UA 6 5 4 5, 5 0, 5 5, 6 0, 6 5, 7 0, 7 5, 8 0    Leukocytes, UA Negative Negative    Nitrite, UA Negative Negative    Protein, UA Negative Negative mg/dl    Glucose, UA Negative Negative mg/dl    Ketones, UA Negative Negative mg/dl    Bilirubin, UA Negative Negative    Blood, UA 10 0 (A) Negative    UROBILINOGEN UA Negative 1 0, Negative mg/dL   Urine Microscopic    Collection Time: 09/11/21  2:00 PM   Result Value Ref Range    RBC, UA None Seen None Seen, 0-1, 1-2, 2-4, 0-5 /hpf    WBC, UA None Seen None Seen, 0-1, 1-2, 0-5, 2-4 /hpf    Epithelial Cells Occasional None Seen, Occasional /hpf    Bacteria, UA None Seen None Seen, Occasional /hpf   CBC and differential    Collection Time: 09/11/21  9:24 PM   Result Value Ref Range    WBC 9 50 4 50 - 11 00 Thousand/uL    RBC 3 95 (L) 4 50 - 5 90 Million/uL    Hemoglobin 12 4 (L) 13 5 - 17 5 g/dL    Hematocrit 35 7 (L) 41 0 - 53 0 %    MCV 90 80 - 100 fL    MCH 31 4 26 0 - 34 0 pg    MCHC 34 7 31 0 - 36 0 g/dL    RDW 12 4 <15 3 %    MPV 6 1 (L) 8 9 - 12 7 fL    Platelets 081 465 - 848 Thousands/uL    Neutrophils Relative 82 (H) 45 - 65 %    Lymphocytes Relative 13 (L) 25 - 45 %    Monocytes Relative 5 1 - 10 %    Eosinophils Relative 0 0 - 6 %    Basophils Relative 0 0 - 1 %    Neutrophils Absolute 7 80 1 80 - 7 80 Thousands/µL    Lymphocytes Absolute 1 20 0 50 - 4 00 Thousands/µL    Monocytes Absolute 0 50 0 20 - 0 90 Thousand/µL    Eosinophils Absolute 0 00 0 00 - 0 40 Thousand/µL    Basophils Absolute 0 00 0 00 - 0 10 Thousands/µL   Comprehensive metabolic panel    Collection Time: 09/11/21  9:24 PM   Result Value Ref Range    Sodium 140 137 - 147 mmol/L    Potassium 3 6 3 6 - 5 0 mmol/L    Chloride 102 97 - 108 mmol/L    CO2 31 (H) 22 - 30 mmol/L    ANION GAP 7 5 - 14 mmol/L    BUN 10 5 - 25 mg/dL    Creatinine 0 69 (L) 0 70 - 1 50 mg/dL    Glucose 144 (H) 70 - 99 mg/dL    Calcium 8 7 8 4 - 10 2 mg/dL    AST 27 17 - 59 U/L    ALT 24 <50 U/L    Alkaline Phosphatase 84 43 - 122 U/L    Total Protein 7 3 5 9 - 8 4 g/dL    Albumin 3 7 3 0 - 5 2 g/dL    Total Bilirubin 0 66 <1 30 mg/dL    eGFR 105 >60 ml/min/1 73sq m   Lactic acid, plasma    Collection Time: 09/11/21  9:24 PM   Result Value Ref Range    LACTIC ACID 1 7 0 7 - 2 0 mmol/L   COVID19, Influenza A/B, RSV PCR, [facility]    Collection Time: 09/11/21  9:24 PM    Specimen: Nasopharyngeal Swab; Nares   Result Value Ref Range    SARS-CoV-2 Negative Negative    INFLUENZA A PCR Negative Negative    INFLUENZA B PCR Negative Negative    RSV PCR Negative Negative   ECG 12 lead    Collection Time: 09/11/21  9:29 PM   Result Value Ref Range    Ventricular Rate 54 BPM    Atrial Rate 54 BPM    IN Interval 158 ms    QRSD Interval 74 ms    QT Interval 472 ms    QTC Interval 447 ms    P Axis 78 degrees    QRS Axis 52 degrees    T Wave Axis 54 degrees       @RESULTRCNTPROC[24H@    Exam:     Skin: erythema, edema and fluctulence      wound located dorsum of right hand and wrist and measuring 4 x 3 cm area is irregular     Musculoskeletal: wrist  right  Swollen and decrease ROM due to pain      Vascular: radial artery Left  2+ (normal) / Right  2+ (normal)     Neurologic: normal sensation in distribution of radial, median, ulnar nerves  bilateral    Code Status: No Order  Advance Directive and Living Will:      Power of :    POLST:

## 2021-09-12 NOTE — H&P
INTERNAL MEDICINE RESIDENCY ADMISSION H&P     Name: Arnaldo Orr   Age & Sex: 61 y o  male   MRN: [id number]  Unit/Bed#: [facility] 314-01   Encounter: [id number]  Primary Care Provider: No primary care provider on file  Code Status: Level 1 - Full Code  Admission Status: OBSERVATION  Disposition: Patient requires Med/Surg    Admit to team: SOD Team B     ASSESSMENT/PLAN     Principal Problem:    Cellulitis and abscess of hand  Active Problems:    [facility]    Tobacco abuse      * Cellulitis and abscess of hand  Assessment & Plan  Male with history of IV drug abuse now presenting with right hand pain and swelling that had been ongoing for 2 days  CTA right upper extremity demonstrating a 3 1 x 1 9 x 3 6 cm subcutaneous fluid collection over the dorsum the right hand overlying the 2nd through 4th metacarpals consistent with abscess  Also noted evidence of cellulitis  Patient now status post I&D completed by Hand surgery  Patient started on IV Cefazolin per hand surgery  However patient with history of MRSA infection with positive wound cultures back in 2020  Therefore, patient also started on vancomycin  Plan:  · Continue antibiotics with IV cefazolin and vancomycin  · Follow-up blood and wound cultures  · Continue to monitor WBC trend fever curve  · Scheduled Tylenol and Motrin for pain control  · Plastic/Hand surgery consulted and appreciate recommendations    IV drug abuse ([facility] )  Assessment & Plan  History of IV drug abuse  Patient admits to using both cocaine and heroin with last use the morning of 09/12/2021  Patient checks in bilateral upper extremities and is currently admitted for cellulitis and abscess of the right hand likely secondary to IV drug abuse  Plan:  · Monitor for signs of withdrawal  · Conservative, symptomatic care  · Continue to encourage drug use cessation    Tobacco abuse  Assessment & Plan  History of tobacco abuse  Patient admits to smoking 1 pack per day  Denies nicotine patch  Plan:  · Continue to encourage smoking cessation      VTE Pharmacologic Prophylaxis: Heparin  VTE Mechanical Prophylaxis: sequential compression device    CHIEF COMPLAINT     Right upper extremity cellulitis and abscess      HISTORY OF PRESENT ILLNESS     Mr Karlene Nunez is a 61year old male with PMHx of polysubstance abuse including IVDA (heroin and cocaine) and tobacco abuse who presented to [facility] on 9/11/2021 for reevaluation after of right hand pain and swelling after signing out AMA to do heroin  Patient symptoms pain and discomfort had been worsening over the last two days  CT right upper extremity was ordered and demonstrated a, "3 1 x 1 9 x 3 6 cm subcutaneous fluid collection over the dorsum of the right hand overlying the 2nd though 4th metacarpals, consistent with abscess  Additional subcutaneous edema throughout the right hand, wrist, and forearm consistent with cellulitis " Patient was seen and evaluated by hand surgery who then recommended transfer to [facility] for I&D  Patient was seen and examined following the procedure  Patient sitting up in bed comfortably in no acute distress or visible discomfort  Saturating appropriately on RA  Patient complaining of 8/10 in his RUE but otherwise asymptomatic and denying and systemic symptoms  Patient last used yesterday afternoon prior to admission  Patient admits to using "4-bags" and explains that he mixed both cocaine and heroin  Patient will be admitted to the general medicine service for RUE abscess and cellulitis secondary to IVDA  Per discussion with the patient, patient will remain a LEVEL 1 - FULL CODE  REVIEW OF SYSTEMS     Review of Systems   Constitutional: Negative for activity change, appetite change, chills, diaphoresis, fatigue and fever  Eyes: Negative for visual disturbance  Respiratory: Negative for cough and shortness of breath      Cardiovascular: Negative for chest pain, palpitations and leg swelling  Gastrointestinal: Negative for constipation, diarrhea, nausea and vomiting  Musculoskeletal: Negative for back pain  Skin: Positive for wound  Negative for color change and pallor  Neurological: Negative for dizziness, weakness, light-headedness and headaches  Psychiatric/Behavioral: Negative for agitation and confusion  OBJECTIVE     Vitals:    21 1500 21 1505 21 1515   BP: 122/82 127/75 140/74   Pulse: 82  70   Resp: 19  20   Temp: 98 9 °F (37 2 °C)  98 °F (36 7 °C)   TempSrc: Temporal     SpO2: 98%  98%      Temperature:   Temp (24hrs), Av 5 °F (36 9 °C), Min:98 °F (36 7 °C), Max:98 9 °F (37 2 °C)    Temperature: 98 °F (36 7 °C)  Intake & Output:  I/O       09/10 07 -  0700  07 -  0700  07 0700    I  V    500    Total Intake   500    Net   +500           Unmeasured Stool Occurrence   1 x        Weights: There is no height or weight on file to calculate BMI  Weight (last 2 days)     None        Physical Exam  Constitutional:       General: He is not in acute distress  Appearance: Normal appearance  He is normal weight  He is not ill-appearing or diaphoretic  HENT:      Head: Normocephalic and atraumatic  Nose: Nose normal  No congestion  Mouth/Throat:      Mouth: Mucous membranes are moist       Pharynx: Oropharynx is clear  No oropharyngeal exudate  Eyes:      General: No scleral icterus  Conjunctiva/sclera: Conjunctivae normal    Cardiovascular:      Rate and Rhythm: Normal rate and regular rhythm  Pulses: Normal pulses  Heart sounds: Normal heart sounds  No murmur heard  Pulmonary:      Effort: Pulmonary effort is normal  No respiratory distress  Breath sounds: No wheezing  Comments: Saturating appropriately on RA  Course breath sounds bilaterally and diffusely  Abdominal:      General: Abdomen is flat  Bowel sounds are normal       Palpations: Abdomen is soft  Tenderness: There is no abdominal tenderness  Musculoskeletal:         General: Deformity present  Cervical back: Normal range of motion  No rigidity  Right lower leg: No edema  Left lower leg: No edema  Comments: Right hand bandaged  Bandage intact with no evidence of serosanginous drainage  Skin:     Findings: Lesion (Track marks notes on b/l upper extremities) present  Neurological:      Mental Status: He is alert and oriented to person, place, and time  Motor: No weakness  Psychiatric:         Mood and Affect: Mood normal          Behavior: Behavior normal        PAST MEDICAL HISTORY     Past Medical History:   Diagnosis Date    Asthma      PAST SURGICAL HISTORY   No past surgical history on file  SOCIAL & FAMILY HISTORY     Social History     Substance and Sexual Activity   Alcohol Use Never     Substance and Sexual Activity   Alcohol Use Never        Substance and Sexual Activity   Drug Use Yes    Types: Marijuana, Heroin, Cocaine    Comment: States he "uses about everything"     Social History     Tobacco Use   Smoking Status Current Every Day Smoker    Packs/day: 1 00   Smokeless Tobacco Never Used     No family history on file  LABORATORY DATA     Labs: I have personally reviewed pertinent reports      Results from last 7 days   Lab Units 09/11/21 2124 09/11/21  1207   WBC Thousand/uL 9 50 8 80   HEMOGLOBIN g/dL 12 4* 12 4*   HEMATOCRIT % 35 7* 36 5*   PLATELETS Thousands/uL 245 247   NEUTROS PCT % 82* 84*   MONOS PCT % 5 5      Results from last 7 days   Lab Units 09/11/21 2124 09/11/21  1207   POTASSIUM mmol/L 3 6 3 7   CHLORIDE mmol/L 102 102   CO2 mmol/L 31* 32*   BUN mg/dL 10 9   CREATININE mg/dL 0 69* 0 53*   CALCIUM mg/dL 8 7 8 6   ALK PHOS U/L 84 89   ALT U/L 24 25   AST U/L 27 27                  Results from last 7 days   Lab Units 09/11/21 2124   LACTIC ACID mmol/L 1 7         Micro:  Lab Results   Component Value Date    BLOODCX Received in Microbiology Lab  Culture in Progress  09/11/2021    BLOODCX Received in Microbiology Lab  Culture in Progress  09/11/2021    BLOODCX No Growth at 24 hrs  09/11/2021    WOUNDCULT (A) 01/26/2020     1 colony Methicillin Resistant Staphylococcus aureus    WOUNDCULT Few Colonies of  01/26/2020    WOUNDCULT (A) 01/26/2020     1+ Growth of Methicillin Resistant Staphylococcus aureus    WOUNDCULT Few Colonies of  01/26/2020     IMAGING & DIAGNOSTIC TESTS     Imaging: I have personally reviewed pertinent reports  No results found  EKG, Pathology, and Other Studies: I have personally reviewed pertinent reports  ALLERGIES     Allergies   Allergen Reactions    Vicodin [Hydrocodone-Acetaminophen]      MEDICATIONS PRIOR TO ARRIVAL     Prior to Admission medications    Medication Sig Start Date End Date Taking?  Authorizing Provider   diclofenac sodium (VOLTAREN) 50 mg EC tablet Take 1 tablet (50 mg total) by mouth 2 (two) times a day  Patient not taking: Reported on 9/11/2021 1/1/19   Jonathan Sewell PA-C   naproxen (NAPROSYN) 500 mg tablet Take 1 tablet (500 mg total) by mouth 2 (two) times a day with meals  Patient not taking: Reported on 9/11/2021 9/9/19   Raymond Hu PA-C     MEDICATIONS ADMINISTERED IN LAST 24 HOURS     Medication Administration - last 24 hours from 09/11/2021 2123 to 09/12/2021 2123       Date/Time Order Dose Route Action Action by     09/12/2021 1527 lactated ringers infusion 0 mL/kg/hr Intravenous Stopped Danielle Levine RN     09/12/2021 1500 lactated ringers infusion   Intravenous Anesthesia Volume Adjustment Aggie Mcclain CRNA     09/12/2021 1415 lactated ringers infusion   Intravenous Canceled Entry Aggie Mcclain CRNA     09/12/2021 1408 lactated ringers infusion   Intravenous New Bag Aggie Mcclain CRNA     09/12/2021 1701 ibuprofen (MOTRIN) tablet 600 mg 600 mg Oral Given Dudley Valladares RN     09/12/2021 2017 naproxen (NAPROSYN) tablet 500 mg 500 mg Oral Given Dudley Valladares RN 09/12/2021 1805 lactated ringers infusion 75 mL/hr Intravenous Refused Yolie Garcia, CHANTELLE     09/12/2021 1702 acetaminophen (TYLENOL) tablet 975 mg 975 mg Oral Given Yolie Garcia, CHANTELLE     09/12/2021 1705 heparin (porcine) subcutaneous injection 5,000 Units 5,000 Units Subcutaneous Refused Yolie Garcia, CHANTELLE     09/12/2021 1522 HYDROmorphone (DILAUDID) injection 1 mg 1 mg Intravenous Given Sophia Carrasco, CHANTELLE     09/12/2021 1515 HYDROmorphone (DILAUDID) injection 1 mg 1 mg Intravenous Given Sophia Carrasco RN     09/12/2021 1510 HYDROmorphone (DILAUDID) injection 1 mg 1 mg Intravenous Given Sophia Carrasco RN     09/12/2021 1505 HYDROmorphone (DILAUDID) injection 1 mg 1 mg Intravenous Given Sophia Carrasco RN        CURRENT MEDICATIONS     Current Facility-Administered Medications   Medication Dose Route Frequency Provider Last Rate    acetaminophen  975 mg Oral Q8H Shani Luna MD      cefazolin  1,000 mg Intravenous Q8H Shani Luna MD      heparin (porcine)  5,000 Units Subcutaneous [facility] Shani Luna MD      ibuprofen  600 mg Oral Q6H Shani Luna MD      lactated ringers  75 mL/hr Intravenous Continuous Shani Luna MD      ondansetron  4 mg Oral Q6H PRN Deonna Shupp, DO      vancomycin  15 mg/kg Intravenous Q12H Deonna Shupp, DO       lactated ringers, 75 mL/hr      ondansetron, 4 mg, Q6H PRN        Admission Time  I spent 30 minutes admitting the patient  This involved direct patient contact where I performed a full history and physical, reviewing previous records, and reviewing laboratory and other diagnostic studies  Portions of the record may have been created with voice recognition software  Occasional wrong word or "sound a like" substitutions may have occurred due to the inherent limitations of voice recognition software    Read the chart carefully and recognize, using context, where substitutions have occurred     ==  Deonna Mosley, [address] [facility]  Internal Medicine Residency PGY-2

## 2021-09-12 NOTE — ED PROVIDER NOTES
History  Chief Complaint   Patient presents with    Hand Swelling     pt c/o left hand swelling x 2 days  pt seen for same and left AMA earlier today  pt admits to snorting approx 10 bags of heroin after leaving  Patient presents for an evaluation of R hand pain/ swelling x2 days  Patient seen and evaluated here for same earlier this afternoon but left AMA after being admitted  He states he left to do a bundle of IV heroin  He is now back  States he is willing to stay at this point - "You have my word"  Denies any new or worsening symptoms since leaving AMA earlier this afternoon  No other complaints  Prior to Admission Medications   Prescriptions Last Dose Informant Patient Reported? Taking?   diclofenac sodium (VOLTAREN) 50 mg EC tablet   No No   Sig: Take 1 tablet (50 mg total) by mouth 2 (two) times a day   Patient not taking: Reported on 9/11/2021   naproxen (NAPROSYN) 500 mg tablet   No No   Sig: Take 1 tablet (500 mg total) by mouth 2 (two) times a day with meals   Patient not taking: Reported on 9/11/2021      Facility-Administered Medications: None       Past Medical History:   Diagnosis Date    Asthma        History reviewed  No pertinent surgical history  History reviewed  No pertinent family history  I have reviewed and agree with the history as documented  E-Cigarette/Vaping    E-Cigarette Use Never User      E-Cigarette/Vaping Substances     Social History     Tobacco Use    Smoking status: Current Every Day Smoker     Packs/day: 1 00    Smokeless tobacco: Never Used   Vaping Use    Vaping Use: Never used   Substance Use Topics    Alcohol use: Never    Drug use: Yes     Types: Marijuana, Heroin, Cocaine     Comment: States he "uses about everything"       Review of Systems   Constitutional: Negative for chills and fever  HENT: Negative for congestion, ear pain and sore throat  Eyes: Negative for pain  Respiratory: Negative for cough and shortness of breath  Cardiovascular: Negative for chest pain  Gastrointestinal: Negative for abdominal pain, nausea and vomiting  Genitourinary: Negative for dysuria  Musculoskeletal: Negative for back pain  Skin: Positive for color change and wound  Negative for rash  Neurological: Negative for dizziness, weakness and numbness  Psychiatric/Behavioral: Negative for suicidal ideas  All other systems reviewed and are negative  Physical Exam  Physical Exam  Vitals reviewed  Constitutional:       General: He is not in acute distress  Appearance: He is well-developed  He is not diaphoretic  Comments: Disheveled appearing, visibly high, answers appropriately to verbal stimuli   HENT:      Head: Normocephalic and atraumatic  Right Ear: External ear normal       Left Ear: External ear normal       Nose: Nose normal       Mouth/Throat:      Mouth: Mucous membranes are moist       Pharynx: Oropharynx is clear  Eyes:      Pupils: Pupils are equal, round, and reactive to light  Cardiovascular:      Rate and Rhythm: Normal rate and regular rhythm  Heart sounds: Normal heart sounds  Pulmonary:      Effort: Pulmonary effort is normal       Breath sounds: Normal breath sounds  Abdominal:      General: Bowel sounds are normal       Palpations: Abdomen is soft  Tenderness: There is no abdominal tenderness  Musculoskeletal:         General: Normal range of motion  Hands:       Cervical back: Normal range of motion and neck supple  Skin:     General: Skin is warm and dry  Neurological:      Mental Status: He is alert and oriented to person, place, and time           Vital Signs  ED Triage Vitals [09/11/21 2106]   Temperature Pulse Respirations Blood Pressure SpO2   (!) 97 2 °F (36 2 °C) 89 18 131/78 98 %      Temp Source Heart Rate Source Patient Position - Orthostatic VS BP Location FiO2 (%)   Tympanic Monitor Sitting Left arm --      Pain Score       --           Vitals:    09/11/21 2106 09/11/21 2345 09/12/21 0100 09/12/21 0400   BP: 131/78 107/66 110/68 115/71   Pulse: 89 60 56 (!) 49   Patient Position - Orthostatic VS: Sitting Lying Lying Lying         Visual Acuity  Visual Acuity      Most Recent Value   L Pupil Size (mm)  1   R Pupil Size (mm)  1          ED Medications  Medications   sodium chloride 0 9 % bolus 1,000 mL (0 mL Intravenous Stopped 9/12/21 0043)   clindamycin (CLEOCIN) IVPB (premix in dextrose) 600 mg 50 mL (0 mg Intravenous Stopped 9/11/21 2253)   naloxone (NARCAN) injection 4 mg (4 mg Intravenous Given 9/11/21 2145)   ondansetron (ZOFRAN) injection 4 mg (4 mg Intravenous Given 9/11/21 2145)       Diagnostic Studies  Results Reviewed     Procedure Component Value Units Date/Time    COVID19, Influenza A/B, RSV PCR, [facility] [[id number]]  (Normal) Collected: 09/11/21 2124    Lab Status: Final result Specimen: Nares from Nasopharyngeal Swab Updated: 09/11/21 2220     SARS-CoV-2 Negative     INFLUENZA A PCR Negative     INFLUENZA B PCR Negative     RSV PCR Negative    Narrative: This test has been authorized by FDA under an EUA (Emergency Use Assay) for use by authorized laboratories  Clinical caution and judgement should be used with the interpretation of these results with consideration of the clinical impression and other laboratory testing  Testing reported as "Positive" or "Negative" has been proven to be accurate according to standard laboratory validation requirements  All testing is performed with control materials showing appropriate reactivity at standard intervals  Lactic acid, plasma [[id number]]  (Normal) Collected: 09/11/21 2124    Lab Status: Final result Specimen: Blood from Arm, Left Updated: 09/11/21 2151     LACTIC ACID 1 7 mmol/L     Narrative:      Result may be elevated if tourniquet was used during collection      Comprehensive metabolic panel [[id number]]  (Abnormal) Collected: 09/11/21 2124    Lab Status: Final result Specimen: Blood from Arm, Left Updated: 09/11/21 2151     Sodium 140 mmol/L      Potassium 3 6 mmol/L      Chloride 102 mmol/L      CO2 31 mmol/L      ANION GAP 7 mmol/L      BUN 10 mg/dL      Creatinine 0 69 mg/dL      Glucose 144 mg/dL      Calcium 8 7 mg/dL      AST 27 U/L      ALT 24 U/L      Alkaline Phosphatase 84 U/L      Total Protein 7 3 g/dL      Albumin 3 7 g/dL      Total Bilirubin 0 66 mg/dL      eGFR 105 ml/min/1 73sq m     Narrative:      [place] guidelines for Chronic Kidney Disease (CKD):     Stage 1 with normal or high GFR (GFR > 90 mL/min/1 73 square meters)    Stage 2 Mild CKD (GFR = 60-89 mL/min/1 73 square meters)    Stage 3A Moderate CKD (GFR = 45-59 mL/min/1 73 square meters)    Stage 3B Moderate CKD (GFR = 30-44 mL/min/1 73 square meters)    Stage 4 Severe CKD (GFR = 15-29 mL/min/1 73 square meters)    Stage 5 End Stage CKD (GFR <15 mL/min/1 73 square meters)  Note: GFR calculation is accurate only with a steady state creatinine    CBC and differential [[id number]]  (Abnormal) Collected: 09/11/21 2124    Lab Status: Final result Specimen: Blood from Arm, Left Updated: 09/11/21 2135     WBC 9 50 Thousand/uL      RBC 3 95 Million/uL      Hemoglobin 12 4 g/dL      Hematocrit 35 7 %      MCV 90 fL      MCH 31 4 pg      MCHC 34 7 g/dL      RDW 12 4 %      MPV 6 1 fL      Platelets 684 Thousands/uL      Neutrophils Relative 82 %      Lymphocytes Relative 13 %      Monocytes Relative 5 %      Eosinophils Relative 0 %      Basophils Relative 0 %      Neutrophils Absolute 7 80 Thousands/µL      Lymphocytes Absolute 1 20 Thousands/µL      Monocytes Absolute 0 50 Thousand/µL      Eosinophils Absolute 0 00 Thousand/µL      Basophils Absolute 0 00 Thousands/µL     Blood culture #1 [[id number]] Collected: 09/11/21 2124    Lab Status: In process Specimen: Blood from Arm, Left Updated: 09/11/21 2131    Blood culture #2 [[id number]] Collected: 09/11/21 2124    Lab Status:  In process Specimen: Blood from Arm, Left Updated: 09/11/21 2131    UA w Reflex to Microscopic w Reflex to Culture [[id number]]     Lab Status: No result Specimen: Urine, Clean Catch     Rapid drug screen, urine [[id number]]     Lab Status: No result Specimen: Urine                  No orders to display              Procedures  Procedures         ED Course  ED Course as of Sep 12 0654   Sat Sep 11, 2021   2133 Procedure Note: EKG  Date/Time: 09/11/21 9:35 PM   Performed by: Alexandra Watson  Authorized by: Alexandra Watson  ECG interpreted by me, the ED Provider: yes   The EKG demonstrates:  Rate 54  Rhythm sinus bradycardia  QTc 447  No ST elevations/depressions          2138 Becoming less responsive  Still protecting airway  Will administer Narcan      2157 Patient with several episodes of vomiting s/p Narcan  O2 still %      2335 Patient reevaluated - still willing to stay  TT Dr Cong Solo      2350 Dr Cong Solo wishes to have patient admitted to medicine team at [facility] with plan to have surgery tomorrow morning  Transfer order placed      Sun Sep 12, 2021   0011 Spoke with Dr Andre Coyle  Will be accepted under care of Dr Luis Eduardo Solo only wishes for patient to have IV clindamycin at this time                                SBIRT 22yo+      Most Recent Value   SBIRT (25 yo +)   In order to provide better care to our patients, we are screening all of our patients for alcohol and drug use  Would it be okay to ask you these screening questions? Unable to answer at this time Filed at: 09/11/2021 2211   GERMAIN: How many times in the past year have you    Used an illegal drug or used a prescription medication for non-medical reasons? Daily or Almost Daily Filed at: 09/11/2021 2211   DAST-10: In the past 12 months      1  Have you used drugs other than those required for medical reasons? 1 Filed at: 09/11/2021 2211   2  Do you use more than one drug at a time? 1 Filed at: 09/11/2021 2211   3   Have you had medical problems as a result of your drug use (e g , memory loss, hepatitis, convulsions, bleeding, etc )? 1 Filed at: 09/11/2021 2211   4  Have you had "blackouts" or "flashbacks" as a result of drug use? YesNo  1 Filed at: 09/11/2021 2211   5  Do you ever feel bad or guilty about your drug use? 1 Filed at: 09/11/2021 2211   6  Does your spouse (or parent) ever complain about your involvement with drugs? 1 Filed at: 09/11/2021 2211   7  Have you neglected your family because of your use of drugs? 1 Filed at: 09/11/2021 2211   8  Have you engaged in illegal activities in order to obtain drugs? 1 Filed at: 09/11/2021 2211   9  Have you ever experienced withdrawal symptoms (felt sick) when you stopped taking drugs? 1 Filed at: 09/11/2021 2211   10  Are you always able to stop using drugs when you want to? 1 Filed at: 09/11/2021 2211   DAST-10 Score  (!) 10 Filed at: 09/11/2021 2211                    MDM    Disposition  Final diagnoses:   Cellulitis of right hand   Abscess of right hand     Time reflects when diagnosis was documented in both MDM as applicable and the Disposition within this note     Time User Action Codes Description Comment    9/12/2021 12:15 AM Gardenia Tejeda Add [H21 833] Cellulitis of right hand     9/12/2021 12:15 AM Gardenia Tejeda Add [L02 511] Abscess of right hand       ED Disposition     ED Disposition Condition Date/Time Comment    Transfer to Another Facility-In Network  Sun Sep 12, 2021 12:15 AM Primus Duet should be transferred out to [facility] Medardo Wilson MD Documentation      Most Recent Value   Patient Condition  The patient has been stabilized such that within reasonable medical probability, no material deterioration of the patient condition or the condition of the unborn child(sushma) is likely to result from the transfer   Reason for Transfer  Level of Care needed not available at this facility   Benefits of Transfer  Specialized equipment and/or services available at the receiving facility (Include comment)________________________   Risks of Transfer  Potential for delay in receiving treatment   Accepting Physician  [address] [place] [address] Street Name, Virginia Richard MD  [facility]   Provider Certification  General risk, such as traffic hazards, adverse weather conditions, rough terrain or turbulence, possible failure of equipment (including vehicle or aircraft), or consequences of actions of persons outside the control of the transport personnel      RN Documentation      [facility] [address] [facility] [address] Name, Virginia Armas      Follow-up Information    None         Patient's Medications   Discharge Prescriptions    No medications on file     No discharge procedures on file      PDMP Review     None          ED Provider  Electronically Signed by           Jenni Najjar, PA-C  09/12/21 8394

## 2021-09-12 NOTE — ED NOTES
Pt is nodding off and unable to answer questions  Pt mumbles words when given a firm sternal chest rub  He did report using 10 bags of heroine today        Quentin Dowd RN  09/11/21 4422

## 2021-09-12 NOTE — OP NOTE
OPERATIVE REPORT  PATIENT NAME: Venessa Low    :  1965  MRN: [id number]  Pt Location: BE OR ROOM 04    SURGERY DATE: 2021    Surgeon(s) and Role:     * Owen Rios MD - Primary    Preop Diagnosis:  * No pre-op diagnosis entered *    * No Diagnosis Codes entered *    Procedure(s) (LRB):  INCISION AND DRAINAGE (I&D) EXTREMITY (Right)    Specimen(s):  ID Type Source Tests Collected by Time Destination   A : hand abcess Tissue Hand, Right ANAEROBIC CULTURE AND GRAM STAIN, FUNGAL CULTURE, CULTURE, TISSUE AND GRAM STAIN, AFB CULTURE WITH STAIN Owen Rios MD 2021 1432        Estimated Blood Loss:   Minimal    Drains:  * No LDAs found *    Anesthesia Type:   * No anesthesia type entered *    Operative Indications:  * No pre-op diagnosis entered *      Operative Findings:  15 c c  of purulence dorsum right hand   Cultures obtained     Complications:   None    Procedure and Technique:  Patient was met in the preoperative holding area, site of surgery was marked and verified he manifest understanding of the procedure before signing informed consent him into the operative theater  Patient was placed in supine position after smooth anesthesia was then induced the right upper extremity was then prepped and draped in usual sterile fashion  After time-out was performed dorsal block with 1% lidocaine with epinephrine was infiltrated with 10 mL and a longitudinal incision was then placed along the dorsum of the hand over the 3rd metacarpal right on top of the point of maximal fluctuance  Purulence was immediately extracted in cultures were obtained and all the septations were bluntly dissected with a hemostat on the entire surface of the dorsum of the hand and proximal wrist until all the purulence was extracted  The wound was then irrigated with 2 L of normal saline and no further purulence was obtained    The wound was then packed with iodoform packing and dry dressings then applied       I was present for the entire procedure and A qualified resident physician was not available    Patient Disposition:  PACU  and hemodynamically stable    SIGNATURE: Xiao Garber MD  DATE: September 12, 2021  TIME: 2:46 PM

## 2021-09-12 NOTE — ED NOTES
Patient is resting comfortably on stretcher with eyes closed, remains on monitor       Guido Becker RN  09/12/21 4592

## 2021-09-12 NOTE — PLAN OF CARE
Problem: Potential for Falls  Goal: Patient will remain free of falls  Description: INTERVENTIONS:  - Educate patient/family on patient safety including physical limitations  - Instruct patient to call for assistance with activity   - Consult OT/PT to assist with strengthening/mobility   - Keep Call bell within reach  - Keep bed low and locked with side rails adjusted as appropriate  - Keep care items and personal belongings within reach  - Initiate and maintain comfort rounds  - Make Fall Risk Sign visible to staff  - Offer Toileting every  Hours, in advance of need  - Initiate/Maintain alarm  - Obtain necessary fall risk management equipment:   - Apply yellow socks and bracelet for high fall risk patients  - Consider moving patient to room near nurses station  Outcome: Progressing     Problem: CARDIOVASCULAR - ADULT  Goal: Maintains optimal cardiac output and hemodynamic stability  Description: INTERVENTIONS:  - Monitor I/O, vital signs and rhythm  - Monitor for S/S and trends of decreased cardiac output  - Administer and titrate ordered vasoactive medications to optimize hemodynamic stability  - Assess quality of pulses, skin color and temperature  - Assess for signs of decreased coronary artery perfusion  - Instruct patient to report change in severity of symptoms  Outcome: Progressing     Problem: GASTROINTESTINAL - ADULT  Goal: Minimal or absence of nausea and/or vomiting  Description: INTERVENTIONS:  - Administer IV fluids if ordered to ensure adequate hydration  - Maintain NPO status until nausea and vomiting are resolved  - Nasogastric tube if ordered  - Administer ordered antiemetic medications as needed  - Provide nonpharmacologic comfort measures as appropriate  - Advance diet as tolerated, if ordered  - Consider nutrition services referral to assist patient with adequate nutrition and appropriate food choices  Outcome: Progressing  Goal: Maintains or returns to baseline bowel function  Description: INTERVENTIONS:  - Assess bowel function  - Encourage oral fluids to ensure adequate hydration  - Administer IV fluids if ordered to ensure adequate hydration  - Administer ordered medications as needed  - Encourage mobilization and activity  - Consider nutritional services referral to assist patient with adequate nutrition and appropriate food choices  Outcome: Progressing  Goal: Maintains adequate nutritional intake  Description: INTERVENTIONS:  - Monitor percentage of each meal consumed  - Identify factors contributing to decreased intake, treat as appropriate  - Assist with meals as needed  - Monitor I&O, weight, and lab values if indicated  - Obtain nutrition services referral as needed  Outcome: Progressing     Problem: GENITOURINARY - ADULT  Goal: Maintains or returns to baseline urinary function  Description: INTERVENTIONS:  - Assess urinary function  - Encourage oral fluids to ensure adequate hydration if ordered  - Administer IV fluids as ordered to ensure adequate hydration  - Administer ordered medications as needed  - Offer frequent toileting  - Follow urinary retention protocol if ordered  Outcome: Progressing     Problem: SKIN/TISSUE INTEGRITY - ADULT  Goal: Skin Integrity remains intact(Skin Breakdown Prevention)  Description: Assess:  -Perform Vinod assessment every   -Clean and moisturize skin every   -Inspect skin when repositioning, toileting, and assisting with ADLS  -Assess under medical devices such as  every   -Assess extremities for adequate circulation and sensation     Bed Management:  -Have minimal linens on bed & keep smooth, unwrinkled  -Change linens as needed when moist or perspiring  -Avoid sitting or lying in one position for more than  hours while in bed  -Keep HOB at degrees     Toileting:  -Offer bedside commode  -Assess for incontinence every   -Use incontinent care products after each incontinent episode such as     Activity:  -Mobilize patient  times a day  -Encourage activity and walks on unit  -Encourage or provide ROM exercises   -Turn and reposition patient every  Hours  -Use appropriate equipment to lift or move patient in bed  -Instruct/ Assist with weight shifting every  when out of bed in chair  -Consider limitation of chair time  hour intervals    Skin Care:  -Avoid use of baby powder, tape, friction and shearing, hot water or constrictive clothing  -Relieve pressure over bony prominences using   -Do not massage red bony areas    Next Steps:  -Teach patient strategies to minimize risks such as    -Consider consults to  interdisciplinary teams such as   Outcome: Progressing     Problem: HEMATOLOGIC - ADULT  Goal: Maintains hematologic stability  Description: INTERVENTIONS  - Assess for signs and symptoms of bleeding or hemorrhage  - Monitor labs  - Administer supportive blood products/factors as ordered and appropriate  Outcome: Progressing     Problem: Nutrition/Hydration-ADULT  Goal: Nutrient/Hydration intake appropriate for improving, restoring or maintaining nutritional needs  Description: Monitor and assess patient's nutrition/hydration status for malnutrition  Collaborate with interdisciplinary team and initiate plan and interventions as ordered  Monitor patient's weight and dietary intake as ordered or per policy  Utilize nutrition screening tool and intervene as necessary  Determine patient's food preferences and provide high-protein, high-caloric foods as appropriate       INTERVENTIONS:  - Monitor oral intake, urinary output, labs, and treatment plans  - Assess nutrition and hydration status and recommend course of action  - Evaluate amount of meals eaten  - Assist patient with eating if necessary   - Allow adequate time for meals  - Recommend/ encourage appropriate diets, oral nutritional supplements, and vitamin/mineral supplements  - Order, calculate, and assess calorie counts as needed  - Recommend, monitor, and adjust tube feedings and TPN/PPN based on assessed needs  - Assess need for intravenous fluids  - Provide specific nutrition/hydration education as appropriate  - Include patient/family/caregiver in decisions related to nutrition  Outcome: Progressing     Problem: MUSCULOSKELETAL - ADULT  Goal: Maintain or return mobility to safest level of function  Description: INTERVENTIONS:  - Assess patient's ability to carry out ADLs; assess patient's baseline for ADL function and identify physical deficits which impact ability to perform ADLs (bathing, care of mouth/teeth, toileting, grooming, dressing, etc )  - Assess/evaluate cause of self-care deficits   - Assess range of motion  - Assess patient's mobility  - Assess patient's need for assistive devices and provide as appropriate  - Encourage maximum independence but intervene and supervise when necessary  - Involve family in performance of ADLs  - Assess for home care needs following discharge   - Consider OT consult to assist with ADL evaluation and planning for discharge  - Provide patient education as appropriate  Outcome: Progressing     Problem: PAIN - ADULT  Goal: Verbalizes/displays adequate comfort level or baseline comfort level  Description: Interventions:  - Encourage patient to monitor pain and request assistance  - Assess pain using appropriate pain scale  - Administer analgesics based on type and severity of pain and evaluate response  - Implement non-pharmacological measures as appropriate and evaluate response  - Consider cultural and social influences on pain and pain management  - Notify physician/advanced practitioner if interventions unsuccessful or patient reports new pain  Outcome: Progressing     Problem: INFECTION - ADULT  Goal: Absence or prevention of progression during hospitalization  Description: INTERVENTIONS:  - Assess and monitor for signs and symptoms of infection  - Monitor lab/diagnostic results  - Monitor all insertion sites, i e  indwelling lines, tubes, and drains  - Monitor endotracheal if appropriate and nasal secretions for changes in amount and color  - [place] appropriate cooling/warming therapies per order  - Administer medications as ordered  - Instruct and encourage patient and family to use good hand hygiene technique  - Identify and instruct in appropriate isolation precautions for identified infection/condition  Outcome: Progressing     Problem: SAFETY ADULT  Goal: Patient will remain free of falls  Description: INTERVENTIONS:  - Educate patient/family on patient safety including physical limitations  - Instruct patient to call for assistance with activity   - Consult OT/PT to assist with strengthening/mobility   - Keep Call bell within reach  - Keep bed low and locked with side rails adjusted as appropriate  - Keep care items and personal belongings within reach  - Initiate and maintain comfort rounds  - Make Fall Risk Sign visible to staff  - Offer Toileting every  Hours, in advance of need  - Initiate/Maintain alarm  - Obtain necessary fall risk management equipment:   - Apply yellow socks and bracelet for high fall risk patients  - Consider moving patient to room near nurses station  Outcome: Progressing  Goal: Maintain or return to baseline ADL function  Description: INTERVENTIONS:  -  Assess patient's ability to carry out ADLs; assess patient's baseline for ADL function and identify physical deficits which impact ability to perform ADLs (bathing, care of mouth/teeth, toileting, grooming, dressing, etc )  - Assess/evaluate cause of self-care deficits   - Assess range of motion  - Assess patient's mobility; develop plan if impaired  - Assess patient's need for assistive devices and provide as appropriate  - Encourage maximum independence but intervene and supervise when necessary  - Involve family in performance of ADLs  - Assess for home care needs following discharge   - Consider OT consult to assist with ADL evaluation and planning for discharge  - Provide patient education as appropriate  Outcome: Progressing     Problem: DISCHARGE PLANNING  Goal: Discharge to home or other facility with appropriate resources  Description: INTERVENTIONS:  - Identify barriers to discharge w/patient and caregiver  - Arrange for needed discharge resources and transportation as appropriate  - Identify discharge learning needs (meds, wound care, etc )  - Arrange for interpretive services to assist at discharge as needed  - Refer to Case Management Department for coordinating discharge planning if the patient needs post-hospital services based on physician/advanced practitioner order or complex needs related to functional status, cognitive ability, or social support system  Outcome: Progressing

## 2021-09-12 NOTE — ANESTHESIA PREPROCEDURE EVALUATION
Procedure:  INCISION AND DRAINAGE (I&D) EXTREMITY (Right Arm)    Relevant Problems   No relevant active problems      Significant illicit drug use including heroin and cocaine  Left AMA 9/11/21 for current hand wound to do more heroin    Recent labs personally reviewed:  Lab Results   Component Value Date    WBC 9 50 09/11/2021    HGB 12 4 (L) 09/11/2021     09/11/2021     Lab Results   Component Value Date    K 3 6 09/11/2021    BUN 10 09/11/2021    CREATININE 0 69 (L) 09/11/2021     Lab Results   Component Value Date    PTT 30 09/09/2019      Lab Results   Component Value Date    INR 0 99 09/09/2019     No results found for: HGBA1C    Physical Exam    Airway    Mallampati score: II  TM Distance: >3 FB  Neck ROM: full     Dental       Cardiovascular  Rhythm: regular, Rate: normal,     Pulmonary  Breath sounds clear to auscultation,     Other Findings        Anesthesia Plan  ASA Score- 3     Anesthesia Type- general with ASA Monitors  Additional Monitors:   Airway Plan: ETT  Comment: Recent cocaine and heroin use <3 days  Elevated risk for cardiovascular complications          Plan Factors-    Chart reviewed  Existing labs reviewed  Patient summary reviewed  Patient is a current smoker  Patient smoked on day of surgery  Induction- intravenous  Postoperative Plan-   Planned trial extubation    Informed Consent- Anesthetic plan and risks discussed with patient  I personally reviewed this patient with the CRNA  Discussed and agreed on the Anesthesia Plan with the CRNA  Genesis Peterson

## 2021-09-12 NOTE — EMTALA/ACUTE CARE TRANSFER
[facility] EMERGENCY DEPARTMENT  [address] [facility] [id number] [phone]  Dept: [phone]      EMTALA TRANSFER CONSENT    NAME Veronica Nevarez                                         1965                              MRN [id number]    I have been informed of my rights regarding examination, treatment, and transfer   by Dr Tori Ornelas: Specialized equipment and/or services available at the receiving facility (Include comment)________________________    Risks: Potential for delay in receiving treatment      Transfer Request   I acknowledge that my medical condition has been evaluated and explained to me by the emergency department physician or other qualified medical person and/or my attending physician who has recommended and offered to me further medical examination and treatment  I understand the Hospital's obligation with respect to the treatment and stabilization of my emergency medical condition  I nevertheless request to be transferred  I release the Hospital, the doctor, and any other persons caring for me from all responsibility or liability for any injury or ill effects that may result from my transfer and agree to accept all responsibility for the consequences of my choice to transfer, rather than receive stabilizing treatment at the Hospital  I understand that because the transfer is my request, my insurance may not provide reimbursement for the services  The Hospital will assist and direct me and my family in how to make arrangements for transfer, but the hospital is not liable for any fees charged by the transport service  In spite of this understanding, I refuse to consent to further medical examination and treatment which has been offered to me, and request transfer to  [facility]on S[facility]chez Name, [address]   I authorize the performance of emergency medical procedures and treatments upon me in both transit and upon arrival at the receiving facility  Additionally, I authorize the release of any and all medical records to the receiving facility and request they be transported with me, if possible  I authorize the performance of emergency medical procedures and treatments upon me in both transit and upon arrival at the receiving facility  Additionally, I authorize the release of any and all medical records to the receiving facility and request they be transported with me, if possible  I understand that the safest mode of transportation during a medical emergency is an ambulance and that the Hospital advocates the use of this mode of transport  Risks of traveling to the receiving facility by car, including absence of medical control, life sustaining equipment, such as oxygen, and medical personnel has been explained to me and I fully understand them  (JEROD CORRECT BOX BELOW)  [  ]  I consent to the stated transfer and to be transported by ambulance/helicopter  [  ]  I consent to the stated transfer, but refuse transportation by ambulance and accept full responsibility for my transportation by car  I understand the risks of non-ambulance transfers and I exonerate the Hospital and its staff from any deterioration in my condition that results from this refusal     X___________________________________________    DATE  21  TIME________  Signature of patient or legally responsible individual signing on patient behalf           RELATIONSHIP TO PATIENT_________________________          Provider Certification    NAME Karlene Nunez                                        [facility] 1965                              MRN [id number]    A medical screening exam was performed on the above named patient  Based on the examination:    Condition Necessitating Transfer The primary encounter diagnosis was Cellulitis of right hand  A diagnosis of Abscess of right hand was also pertinent to this visit  Patient Condition:  The patient has been stabilized such that within reasonable medical probability, no material deterioration of the patient condition or the condition of the unborn child(sushma) is likely to result from the transfer    Reason for Transfer: Level of Care needed not available at this facility    Transfer Requirements: [address]   · Space available and qualified personnel available for treatment as acknowledged by    · Agreed to accept transfer and to provide appropriate medical treatment as acknowledged by       [place]  · Appropriate medical records of the examination and treatment of the patient are provided at the time of transfer   [address] [facility], [address] _______  · Transfer will be performed by qualified personnel from    and appropriate transfer equipment as required, including the use of necessary and appropriate life support measures  Provider Certification: I have examined the patient and explained the following risks and benefits of being transferred/refusing transfer to the patient/family:  General risk, such as traffic hazards, adverse weather conditions, rough terrain or turbulence, possible failure of equipment (including vehicle or aircraft), or consequences of actions of persons outside the control of the transport personnel      Based on these reasonable risks and benefits to the patient and/or the unborn child(sushma), and based upon the information available at the time of the patients examination, I certify that the medical benefits reasonably to be expected from the provision of appropriate medical treatments at another medical facility outweigh the increasing risks, if any, to the individuals medical condition, and in the case of labor to the unborn child, from effecting the transfer      X____________________________________________ DATE 09/12/21        TIME_______      ORIGINAL - SEND TO MEDICAL RECORDS   COPY - SEND WITH PATIENT DURING TRANSFER

## 2021-09-12 NOTE — ANESTHESIA POSTPROCEDURE EVALUATION
Post-Op Assessment Note    CV Status:  Stable  Pain Score: 0    Pain management: adequate     Mental Status:  Alert   Hydration Status:  Stable   PONV Controlled:  None   Airway Patency:  Patent      Post Op Vitals Reviewed: Yes      Staff: Anesthesiologist, CRNA         No complications documented      BP      Temp      Pulse     Resp      SpO2

## 2021-09-13 PROCEDURE — NC001 PR NO CHARGE: Performed by: PLASTIC SURGERY

## 2021-09-13 RX ORDER — HALOPERIDOL 5 MG/ML
2 INJECTION INTRAMUSCULAR EVERY 6 HOURS PRN
Status: DISCONTINUED | OUTPATIENT
Start: 2021-09-13 | End: 2021-09-14 | Stop reason: HOSPADM

## 2021-09-13 RX ORDER — LOPERAMIDE HYDROCHLORIDE 2 MG/1
2 CAPSULE ORAL 3 TIMES DAILY PRN
Status: DISCONTINUED | OUTPATIENT
Start: 2021-09-13 | End: 2021-09-14 | Stop reason: HOSPADM

## 2021-09-13 RX ORDER — DOXYCYCLINE HYCLATE 100 MG/1
100 CAPSULE ORAL EVERY 12 HOURS SCHEDULED
Status: DISCONTINUED | OUTPATIENT
Start: 2021-09-13 | End: 2021-09-14 | Stop reason: HOSPADM

## 2021-09-13 RX ORDER — KETOROLAC TROMETHAMINE 30 MG/ML
15 INJECTION, SOLUTION INTRAMUSCULAR; INTRAVENOUS EVERY 6 HOURS PRN
Status: DISCONTINUED | OUTPATIENT
Start: 2021-09-13 | End: 2021-09-14 | Stop reason: HOSPADM

## 2021-09-13 RX ORDER — HYDROXYZINE HYDROCHLORIDE 25 MG/1
25 TABLET, FILM COATED ORAL EVERY 6 HOURS PRN
Status: DISCONTINUED | OUTPATIENT
Start: 2021-09-13 | End: 2021-09-14 | Stop reason: HOSPADM

## 2021-09-13 RX ORDER — CLONIDINE HYDROCHLORIDE 0.1 MG/1
0.1 TABLET ORAL EVERY 6 HOURS PRN
Status: DISCONTINUED | OUTPATIENT
Start: 2021-09-13 | End: 2021-09-14 | Stop reason: HOSPADM

## 2021-09-13 RX ADMIN — ACETAMINOPHEN 975 MG: 325 TABLET, FILM COATED ORAL at 00:08

## 2021-09-13 RX ADMIN — CEFAZOLIN SODIUM 1000 MG: 1 SOLUTION INTRAVENOUS at 01:04

## 2021-09-13 RX ADMIN — DOXYCYCLINE 100 MG: 100 CAPSULE ORAL at 21:24

## 2021-09-13 RX ADMIN — ACETAMINOPHEN 975 MG: 325 TABLET, FILM COATED ORAL at 23:51

## 2021-09-13 RX ADMIN — HYDROXYZINE HYDROCHLORIDE 25 MG: 25 TABLET ORAL at 08:46

## 2021-09-13 RX ADMIN — HEPARIN SODIUM 5000 UNITS: 5000 INJECTION INTRAVENOUS; SUBCUTANEOUS at 21:24

## 2021-09-13 RX ADMIN — VANCOMYCIN HYDROCHLORIDE 1250 MG: 10 INJECTION, POWDER, LYOPHILIZED, FOR SOLUTION INTRAVENOUS at 12:04

## 2021-09-13 RX ADMIN — ONDANSETRON 4 MG: 4 TABLET, ORALLY DISINTEGRATING ORAL at 08:15

## 2021-09-13 RX ADMIN — KETOROLAC TROMETHAMINE 15 MG: 30 INJECTION, SOLUTION INTRAMUSCULAR; INTRAVENOUS at 08:45

## 2021-09-13 RX ADMIN — CEFAZOLIN SODIUM 1000 MG: 1 SOLUTION INTRAVENOUS at 05:33

## 2021-09-13 RX ADMIN — KETOROLAC TROMETHAMINE 15 MG: 30 INJECTION, SOLUTION INTRAMUSCULAR; INTRAVENOUS at 21:23

## 2021-09-13 RX ADMIN — IBUPROFEN 600 MG: 600 TABLET, FILM COATED ORAL at 23:51

## 2021-09-13 RX ADMIN — IBUPROFEN 600 MG: 600 TABLET, FILM COATED ORAL at 12:06

## 2021-09-13 RX ADMIN — IBUPROFEN 600 MG: 600 TABLET, FILM COATED ORAL at 06:27

## 2021-09-13 NOTE — PROGRESS NOTES
Vancomycin Assessment    Suzie Cardona is a 61 y o  male who is currently receiving vancomycin 1250 mg q12 for skin-soft tissue infection     Relevant clinical data and objective history reviewed:  Creatinine   Date Value Ref Range Status   09/11/2021 0 69 (L) 0 70 - 1 50 mg/dL Final     Comment:     Standardized to IDMS reference method   09/11/2021 0 53 (L) 0 70 - 1 50 mg/dL Final     Comment:     Standardized to IDMS reference method   01/26/2020 0 84 0 70 - 1 50 mg/dL Final     Comment:     Standardized to IDMS reference method     /74   Pulse 70   Temp 98 °F (36 7 °C)   Resp 20   SpO2 98%   I/O last 3 completed shifts: In: 1460 [P O :960; I V :500]  Out: -   Lab Results   Component Value Date/Time    BUN 10 09/11/2021 09:24 PM    WBC 9 50 09/11/2021 09:24 PM    HGB 12 4 (L) 09/11/2021 09:24 PM    HCT 35 7 (L) 09/11/2021 09:24 PM    MCV 90 09/11/2021 09:24 PM     09/11/2021 09:24 PM     Temp Readings from Last 3 Encounters:   09/12/21 98 °F (36 7 °C)   09/11/21 (!) 97 2 °F (36 2 °C) (Tympanic)   09/11/21 99 3 °F (37 4 °C) (Oral)     Vancomycin Days of Therapy: 1    Assessment/Plan  The patient is currently on vancomycin utilizing scheduled dosing based on actual body weight  The patient is currently receiving 1250 mg q12 and is clinically appropriate and dose will be continued  Pharmacy will also follow closely for s/sx of nephrotoxicity, infusion reactions, and appropriateness of therapy  BMP and CBC will be ordered per protocol  Plan for trough as patient approaches steady state, prior to the 4th  dose at approximately 0930 9/14  Due to infection severity, will target a trough of 15-20 (appropriate for most indications)   Pharmacy will continue to follow the patients culture results and clinical progress daily      Wilbur Tobias, Pharmacist

## 2021-09-13 NOTE — PROGRESS NOTES
Progress Note - Plastic Surgery     Name: Angela Diaz  MRN: [id number]  Unit/Bed#: [facility] 314-01  Encounter: [id number]    Assessment: 61y o  year old male POD #1 s/p I&D dorsum of right hand  Doing well from plastic surgery standpoint  Packing removed at the bedside  Spoke with nursing regarding local wound care  Plan:  - Start hand soaks to the affected RIGHT hand for 15-20 minutes TID in dilute betadine/saline solution in a pink basin  Continue with local wound care: No need to pack the wound, please dress with dry dressing on top (ABD if needed) and wrap with Kerlix to secure dressing in place per nursing  Order placed in [address] [place] [address] Rd  - Agree with antibiotics and remainder of excellent care per primary team/ID  - Plastic Surgery following, please contact me with any questions or concerns  - Patient evaluation and plan discussed with Plastic Surgery attending, Dr Gabi De Paz, who agrees with the above  Signature: Kulwinder Taylor MD 9/13/2021 9:41 AM  Plastic Surgery Fellow      Subjective: No fever or chills  Packing removed at the bedside  Objective:   Temp:  [98 °F (36 7 °C)-98 9 °F (37 2 °C)] 98 1 °F (36 7 °C)  HR:  [51-82] 76  Resp:  [14-20] 14  BP: (122-190)/() 190/106  There is no height or weight on file to calculate BMI  NAD, appears comfortable  Nonlabored breathing  Right dorsal hand incision clean with macerated skin, open wound draining minimal serous  Starting hand soaks  Warm  Intake/Output Summary (Last 24 hours) at 9/13/2021 0941  Last data filed at 9/13/2021 0601  Gross per 24 hour   Intake 1460 ml   Output 1200 ml   Net 260 ml       Invasive Devices     Peripheral Intravenous Line            Peripheral IV 09/11/21 Left Antecubital 1 day                I have reviewed the patient's labs and imaging      Results from last 7 days   Lab Units 09/11/21 2124 09/11/21  1207   WBC Thousand/uL 9 50 8 80   HEMOGLOBIN g/dL 12 4* 12 4*   PLATELETS Thousands/uL 245 247     Results from last 7 days   Lab Units 09/11/21 2124 09/11/21  1207   SODIUM mmol/L 140 140   POTASSIUM mmol/L 3 6 3 7   CHLORIDE mmol/L 102 102   CO2 mmol/L 31* 32*   BUN mg/dL 10 9   CREATININE mg/dL 0 69* 0 53*   EGFR ml/min/1 73sq m 105 117   CALCIUM mg/dL 8 7 8 6   AST U/L 27 27   ALT U/L 24 25   ALK PHOS U/L 84 89     Results from last 7 days   Lab Units 09/11/21 2124 09/11/21  1223 09/11/21  1207   BLOOD CULTURE  Received in Microbiology Lab  Culture in Progress  Received in Microbiology Lab  Culture in Progress  No Growth at 24 hrs  No Growth at 24 hrs

## 2021-09-13 NOTE — CONSULTS
Vancomycin IV Pharmacy-to-Dose Consultation    Chago Bowden is a 61 y o  male who was receiving Vancomycin IV with management by the Pharmacy Consult service for treatment of skin-soft tissue infection  The patient's Vancomycin therapy has been completed / discontinued  Thank you for allowing us to take part in this patient's care  Pharmacy will sign-off now; please call or re-consult if there are any questions        Radha Raymundo RP

## 2021-09-13 NOTE — CASE MANAGEMENT
LOS 1 Day  Not a Bundle  Not a Readmission  Unplanned Readmission Risk is 12 GREEN    CM spoke with pt to introduce self and discuss DC planning  Pt states he is homeless, but temporarily staying at his brother's house  IPTA  No DME  Denies hx of IP MH or D/A treatment  Pt states he has been using IV cocaine and heroin, last use yesterday, 9/12 prior to admission  Pt would like a HOST referral for IP treatment  CM called HOST and faxed clinicals to f: [phone] per request   Pt denies hx of VNA or IP rehab for PT  Pt does not have a PCP and uses [facility] [address] in [facility]  Primary Contact: Gladys Delgado (sister in law) [phone]  No POA/LW  Family transport at DC    CM reviewed d/c planning process including the following: identifying help at home, patient preference for d/c planning needs, Discharge Lounge, [facility] Meds to Bed program, availability of treatment team to discuss questions or concerns patient and/or family may have regarding understanding medications and recognizing signs and symptoms once discharged  CM also encouraged patient to follow up with all recommended appointments after discharge  Patient advised of importance for patient and family to participate in managing patients medical well being

## 2021-09-13 NOTE — PROGRESS NOTES
Received phone call from Madan from HOST [facility] over the phone assessment for the patient    refers inpatient detox, will call CM tomorrow

## 2021-09-13 NOTE — UTILIZATION REVIEW
Initial Clinical Review  Transferred from [facility] on 9/11  Pt was at [address] [place] [address] from 9/11 to 9/12  Admission: Date/Time/Statement:   Admission Orders: Observation status on 9/12 1702 changed to IP status on 9/13 1458  Pt requiring continued stay for cellulitis continuation of IV abx and post op care  Ordered        09/13/21 1458  Inpatient Admission  Once         09/12/21 1702  Place in Observation  Once                   Orders Placed This Encounter   Procedures          Standing      1      Level of Care      Med Surg [16]    Inpatient Admission     Standing Status:   Standing     Number of Occurrences:   1     Order Specific Question:   Level of Care     Answer:   Med Surg [16]     Order Specific Question:   Estimated length of stay     Answer:   More than 2 Midnights     Order Specific Question:   Certification     Answer:   I certify that inpatient services are medically necessary for this patient for a duration of greater than two midnights  See H&P and MD Progress Notes for additional information about the patient's course of treatment  ED Arrival Information     Patient not seen in ED                       Initial Presentation: 61year old male was a transfer from [address] [place] [address] to [facility] for RUE abscess and cellulitis secondary to IVDA  PMHx of polysubstance abuse including IVDA (heroin and cocaine) and tobacco abuse  Pt was at [facility] on 9/11/2021 for reevaluation after of right hand pain and swelling after signing out AMA to do heroin  Patient symptoms pain and discomfort had been worsening over the last two days  CT right upper extremity result: 3 1 x 1 9 x 3 6 cm subcutaneous fluid collection over the dorsum of the right hand overlying the 2nd though 4th metacarpals, consistent with abscess  Additional subcutaneous edema throughout the right hand, wrist, and forearm consistent with cellulitis     Admit as observation level of care for cellulitis and abscess of hand and IV drug abuse: continue IV abx, Follow-up blood and wound cultures, Continue to monitor WBC trend fever curve, pain control, Plastic/Hand surgery consulted, monitor for signs of withdrawal     9/12 Plastics Surgery Consult: Right hand abscess secondary to IV drug- I and D, Keep  right upper extremity elevated above level of heart, IV abx,  weight- bearing:  FWB/WBAT, infectious disease consult  9/12- OR s/p INCISION AND DRAINAGE (I&D) EXTREMITY (Right  Operative Findings:  15 c c  of purulence dorsum right hand   Cultures obtained     9/13  Plasticsurg Notes: Start hand soaks to the affected RIGHT hand for 15-20 minutes TID in dilute betadine/saline solution in a pink basin  Continue with local wound care: No need to pack the wound, please dress with dry dressing on top (ABD if needed) and wrap with Kerlix to secure dressing in place per nursing  Internal medicine Notes: Continue IV abx, pending sensitivities and cultures, pain control, f/u wound and bld cultures and monitor for signs of withdrawal     Date; 9/14 Day 2  9/14 Plastic Surgery Note: POD #2 s/p I&D dorsum of right hand, considerably improved exam Follow-up wound cx from I&D: 3+ alpha hemolytic Strep, follow-up sensitivities, continue IV abx         Vitals   Temperature Pulse Respirations Blood Pressure SpO2   09/12/21 1500 09/12/21 1500 09/12/21 1500 09/12/21 1500 09/12/21 1500   98 9 °F (37 2 °C) 82 19 122/82 98 %      Temp Source Heart Rate Source Patient Position - Orthostatic VS BP Location FiO2 (%)   09/12/21 1500 -- 09/13/21 0008 09/13/21 0008 --   Temporal  Lying Left arm       Pain Score       09/12/21 1022       3          Wt Readings from Last 1 Encounters:   09/11/21 67 1 kg (148 lb)     Additional Vital Signs:    Date/Time  Temp  Pulse  Resp  BP  MAP (mmHg)  SpO2  O2 Flow Rate (L/min)  O2 Device  Cardiac (WDL)    09/14/21 0844  --  --  --  195/101  Abnormal   --  --  --  --  --    09/14/21 0732  98 °F (36 7 °C)  60  17 184/96  Abnormal   134  98 %  --  None (Room air)  --    09/13/21 2300  98 3 °F (36 8 °C)  54  Abnormal   18  157/87  112  97 %  --  None (Room air)  --        09/13/21 1153  --  --  --  174/91  Abnormal   --  --  --  --  --    09/13/21 0833  98 1 °F (36 7 °C)  76  14  190/106  Abnormal   157  94 %  --  None (Room air)  --    09/13/21 0008  98 1 °F (36 7 °C)  51  Abnormal   18  122/60  86  99 %  --  None (Room air)  --          Pertinent Labs/Diagnostic Test Results:   9/11  CT right Upper extremity: There is a 3 1 x 1 9 x 3 6 cm subcutaneous fluid collection over the dorsum of the right hand, overlying the 2nd through 4th metacarpals, consistent with abscess given history (series 5 images 55-71 )     There is additional subcutaneous edema throughout the right hand, wrist, and forearm consistent with cellulitis    EKG result: Sinus bradycardia  Otherwise normal ECG  No previous ECGs available    Results from last 7 days   Lab Units 09/11/21 2124   SARS-COV-2  Negative     Results from last 7 days   Lab Units 09/11/21 2124 09/11/21  1207   WBC Thousand/uL 9 50 8 80   HEMOGLOBIN g/dL 12 4* 12 4*   HEMATOCRIT % 35 7* 36 5*   PLATELETS Thousands/uL 245 247   NEUTROS ABS Thousands/µL 7 80 7 40         Results from last 7 days   Lab Units 09/11/21 2124 09/11/21  1207   SODIUM mmol/L 140 140   POTASSIUM mmol/L 3 6 3 7   CHLORIDE mmol/L 102 102   CO2 mmol/L 31* 32*   ANION GAP mmol/L 7 6   BUN mg/dL 10 9   CREATININE mg/dL 0 69* 0 53*   EGFR ml/min/1 73sq m 105 117   CALCIUM mg/dL 8 7 8 6     Results from last 7 days   Lab Units 09/11/21 2124 09/11/21  1207   AST U/L 27 27   ALT U/L 24 25   ALK PHOS U/L 84 89   TOTAL PROTEIN g/dL 7 3 7 2   ALBUMIN g/dL 3 7 3 6   TOTAL BILIRUBIN mg/dL 0 66 0 58         Results from last 7 days   Lab Units 09/11/21 2124 09/11/21  1207   GLUCOSE RANDOM mg/dL 144* 123*     Results from last 7 days   Lab Units 09/11/21 2124 09/11/21  1207   LACTIC ACID mmol/L 1 7 1 3     Results from last 7 days   Lab Units 09/11/21  1207   LIPASE u/L 23             Results from last 7 days   Lab Units 09/11/21  1400   CLARITY UA  Clear   COLOR UA  Yellow   SPEC GRAV UA  1 010   PH UA  6 5   GLUCOSE UA mg/dl Negative   KETONES UA mg/dl Negative   BLOOD UA  10 0*   PROTEIN UA mg/dl Negative   NITRITE UA  Negative   BILIRUBIN UA  Negative   UROBILINOGEN UA mg/dL Negative   LEUKOCYTES UA  Negative   WBC UA /hpf None Seen   RBC UA /hpf None Seen   BACTERIA UA /hpf None Seen   EPITHELIAL CELLS WET PREP /hpf Occasional     Results from last 7 days   Lab Units 09/11/21  2124   INFLUENZA A PCR  Negative   INFLUENZA B PCR  Negative   RSV PCR  Negative         Results from last 7 days   Lab Units 09/11/21  1359   AMPH/METH  Negative   BARBITURATE UR  Negative   BENZODIAZEPINE UR  Negative   COCAINE UR  Positive*   METHADONE URINE  Negative   OPIATE UR  Positive*   PCP UR  Negative   THC UR  Positive*     Results from last 7 days   Lab Units 09/11/21  1359   ETHANOL LVL mg/dL <10                 Results from last 7 days   Lab Units 09/12/21  1432 09/11/21  2124 09/11/21  1223 09/11/21  1207   BLOOD CULTURE   --  No Growth at 24 hrs  No Growth at 24 hrs  No Growth at 48 hrs  No Growth at 48 hrs  GRAM STAIN RESULT  3+ Polys*  3+ Gram positive cocci in pairs and chains*  --   --   --        Past Medical History:   Diagnosis Date    Asthma      Present on Admission;       Admitting Diagnosis:  Cellulitis and abscess of hand L03 119, L02 519      Age/Sex: 61 y o  male    Admission Orders:    Neurovascuar checks    Scheduled Medications:  acetaminophen, 975 mg, Oral, Q8H  cefazolin, 1,000 mg, Intravenous, Q8H  heparin (porcine), 5,000 Units, Subcutaneous, Q8H JOSSELIN  ibuprofen, 600 mg, Oral, Q6H  vancomycin, 20 mg/kg, Intravenous, Q12H      Continuous IV Infusions:  lactated ringers, 75 mL/hr, Intravenous, Continuous      PRN Meds:  cloNIDine, 0 1 mg, Oral, Q6H PRN  haloperidol lactate, 2 mg, Intramuscular, Q6H PRN  hydrOXYzine HCL, 25 mg, Oral, Q6H PRN 9/13 x 1  ketorolac, 15 mg, Intravenous, Q6H PRN 9/13 x 2, 9/14 x 1  loperamide, 2 mg, Oral, TID PRN  ondansetron, 4 mg, Oral, Q6H PRN 9/13 x 1        IP CONSULT TO CASE MANAGEMENT  IP CONSULT TO PHARMACY    Network Utilization Review Department  ATTENTION: Please call with any questions or concerns to [phone] and carefully listen to the prompts so that you are directed to the right person  All voicemails are confidential   Claudell Patient all requests for admission clinical reviews, approved or denied determinations and any other requests to dedicated fax number below belonging to the campus where the patient is receiving treatment   List of dedicated fax numbers for the Facilities:  [address] DENIALS (Administrative/Medical Necessity) [phone]   [address] (Maternity/NICU/Pediatrics) [phone]   [address] [employer] [place] [address] [facility] [place] [address] [address] [place] [address] Lisa Naheed Jacobs 14[address] [facility] [facility] [phone]

## 2021-09-13 NOTE — ASSESSMENT & PLAN NOTE
Male with history of IV drug abuse now presenting with right hand pain and swelling that had been ongoing for 2 days  CTA right upper extremity demonstrating a 3 1 x 1 9 x 3 6 cm subcutaneous fluid collection over the dorsum the right hand overlying the 2nd through 4th metacarpals consistent with abscess  Also noted evidence of cellulitis  Patient now status post I&D completed by Hand surgery  Patient started on IV Cefazolin per hand surgery  However patient with history of MRSA infection with positive wound cultures back in 2020  Therefore, patient also started on vancomycin      Plan:  · POD #1 I&D of right hand  · Continue antibiotics with IV cefazolin and vancomycin  · Will tailor antibiotics pending sensitivities of culture  · Follow-up blood and wound cultures  · Continue to monitor WBC trend fever curve  · Scheduled Tylenol and Motrin for pain control  · Plastic/Hand surgery consulted and appreciate recommendations

## 2021-09-13 NOTE — PROGRESS NOTES
Vancomycin IV Pharmacy-to-Dose Consultation    Mary Kate Saldivar is a 61 y o  male who is currently receiving Vancomycin IV with management by the Pharmacy Consult service  Assessment/Plan:  The patient was reviewed  Patient is refusing labs and we are unable to know whether renal function is stable  Based on todays assessment, continue current vancomycin (day # 2) dosing of 1250mg IV Q12h, with a plan for trough to be drawn at approximately 0930 on 9/14  If patient refuses trough tomorrow, vancomycin as a treatment may need to be reassessed as we are unable to properly monitor  We will continue to follow the patients culture results and clinical progress daily      Riccardo Lo, DebraD

## 2021-09-13 NOTE — ASSESSMENT & PLAN NOTE
History of IV drug abuse  Patient admits to using both cocaine and heroin with last use the morning of 09/12/2021  Patient checks in bilateral upper extremities and is currently admitted for cellulitis and abscess of the right hand likely secondary to IV drug abuse      Plan:  · Monitor for signs of withdrawal  · Conservative, symptomatic care  · Continue to encourage drug use cessation

## 2021-09-13 NOTE — UTILIZATION REVIEW
Initial Clinical Review  Transferred from [facility] on 9/11  Pt was at [address] [place] [address] from 9/11 to 9/12  Admission: Date/Time/Statement:   Admission Orders (From admission, onward)     Ordered        09/12/21 1702  Place in Observation  Once                   Orders Placed This Encounter   Procedures    Place in Observation     Standing Status:   Standing     Number of Occurrences:   1     Order Specific Question:   Level of Care     Answer:   Med Surg [16]     ED Arrival Information     Patient not seen in ED                       Initial Presentation: 61year old male was a transfer from [address] [place] [address] to [address] [place] [address] for RUE abscess and cellulitis secondary to IVDA  PMHx of polysubstance abuse including IVDA (heroin and cocaine) and tobacco abuse  Pt was at [facility] on 9/11/2021 for reevaluation after of right hand pain and swelling after signing out AMA to do heroin  Patient symptoms pain and discomfort had been worsening over the last two days  CT right upper extremity result: 3 1 x 1 9 x 3 6 cm subcutaneous fluid collection over the dorsum of the right hand overlying the 2nd though 4th metacarpals, consistent with abscess  Additional subcutaneous edema throughout the right hand, wrist, and forearm consistent with cellulitis  Admit as observation level of care for cellulitis and abscess of hand and IV drug abuse: continue IV abx, Follow-up blood and wound cultures, Continue to monitor WBC trend fever curve, pain control, Plastic/Hand surgery consulted, monitor for signs of withdrawal     9/12 Plastics Surgery Consult: Right hand abscess secondary to IV drug- I and D, Keep  right upper extremity elevated above level of heart, IV abx,  weight- bearing:  FWB/WBAT, infectious disease consult      9/12- OR s/p INCISION AND DRAINAGE (I&D) EXTREMITY (Right  Operative Findings:  15 c c  of purulence dorsum right hand   Cultures obtained     9/13  Plasticsurg Notes: Start hand soaks to the affected RIGHT hand for 15-20 minutes TID in dilute betadine/saline solution in a pink basin  Continue with local wound care: No need to pack the wound, please dress with dry dressing on top (ABD if needed) and wrap with Kerlix to secure dressing in place per nursing      Internal medicine Notes: Continue IV abx, pending sensitivities and cultures, pain control, f/u wound and bld cultures and monitor for signs of withdrawal            Vitals   Temperature Pulse Respirations Blood Pressure SpO2   09/12/21 1500 09/12/21 1500 09/12/21 1500 09/12/21 1500 09/12/21 1500   98 9 °F (37 2 °C) 82 19 122/82 98 %      Temp Source Heart Rate Source Patient Position - Orthostatic VS BP Location FiO2 (%)   09/12/21 1500 -- 09/13/21 0008 09/13/21 0008 --   Temporal  Lying Left arm       Pain Score       09/12/21 1022       3          Wt Readings from Last 1 Encounters:   09/11/21 67 1 kg (148 lb)     Additional Vital Signs:    09/13/21 1153  --  --  --  174/91Abnormal   --  --  --  --  --  --   09/13/21 0833  98 1 °F (36 7 °C)  76  14  190/106Abnormal   157  94 %  --  None (Room air)  --  Lying   09/13/21 0008  98 1 °F (36 7 °C)  51Abnormal   18  122/60  86  99 %  --  None (Room air)  --  Lying         Pertinent Labs/Diagnostic Test Results:   9/11  CT right Upper extremity: There is a 3 1 x 1 9 x 3 6 cm subcutaneous fluid collection over the dorsum of the right hand, overlying the 2nd through 4th metacarpals, consistent with abscess given history (series 5 images 55-71 )     There is additional subcutaneous edema throughout the right hand, wrist, and forearm consistent with cellulitis    EKG result: Sinus bradycardia  Otherwise normal ECG  No previous ECGs available    Results from last 7 days   Lab Units 09/11/21 2124   SARS-COV-2  Negative     Results from last 7 days   Lab Units 09/11/21 2124 09/11/21  1207   WBC Thousand/uL 9 50 8 80   HEMOGLOBIN g/dL 12 4* 12 4*   HEMATOCRIT % 35 7* 36 5*   PLATELETS Thousands/uL 245 247   NEUTROS ABS Thousands/µL 7 80 7 40         Results from last 7 days   Lab Units 09/11/21 2124 09/11/21  1207   SODIUM mmol/L 140 140   POTASSIUM mmol/L 3 6 3 7   CHLORIDE mmol/L 102 102   CO2 mmol/L 31* 32*   ANION GAP mmol/L 7 6   BUN mg/dL 10 9   CREATININE mg/dL 0 69* 0 53*   EGFR ml/min/1 73sq m 105 117   CALCIUM mg/dL 8 7 8 6     Results from last 7 days   Lab Units 09/11/21 2124 09/11/21  1207   AST U/L 27 27   ALT U/L 24 25   ALK PHOS U/L 84 89   TOTAL PROTEIN g/dL 7 3 7 2   ALBUMIN g/dL 3 7 3 6   TOTAL BILIRUBIN mg/dL 0 66 0 58         Results from last 7 days   Lab Units 09/11/21 2124 09/11/21  1207   GLUCOSE RANDOM mg/dL 144* 123*     Results from last 7 days   Lab Units 09/11/21 2124 09/11/21  1207   LACTIC ACID mmol/L 1 7 1 3     Results from last 7 days   Lab Units 09/11/21  1207   LIPASE u/L 23             Results from last 7 days   Lab Units 09/11/21  1400   CLARITY UA  Clear   COLOR UA  Yellow   SPEC GRAV UA  1 010   PH UA  6 5   GLUCOSE UA mg/dl Negative   KETONES UA mg/dl Negative   BLOOD UA  10 0*   PROTEIN UA mg/dl Negative   NITRITE UA  Negative   BILIRUBIN UA  Negative   UROBILINOGEN UA mg/dL Negative   LEUKOCYTES UA  Negative   WBC UA /hpf None Seen   RBC UA /hpf None Seen   BACTERIA UA /hpf None Seen   EPITHELIAL CELLS WET PREP /hpf Occasional     Results from last 7 days   Lab Units 09/11/21 2124   INFLUENZA A PCR  Negative   INFLUENZA B PCR  Negative   RSV PCR  Negative         Results from last 7 days   Lab Units 09/11/21  1359   AMPH/METH  Negative   BARBITURATE UR  Negative   BENZODIAZEPINE UR  Negative   COCAINE UR  Positive*   METHADONE URINE  Negative   OPIATE UR  Positive*   PCP UR  Negative   THC UR  Positive*     Results from last 7 days   Lab Units 09/11/21  1359   ETHANOL LVL mg/dL <10                 Results from last 7 days   Lab Units 09/12/21  1432 09/11/21 2124 09/11/21  1223 09/11/21  1207   BLOOD CULTURE   --  Received in Microbiology Lab  Culture in Progress    Received in Microbiology Lab  Culture in Progress  No Growth at 24 hrs  No Growth at 24 hrs  GRAM STAIN RESULT  3+ Polys*  3+ Gram positive cocci in pairs and chains*  --   --   --        Past Medical History:   Diagnosis Date    Asthma      Present on Admission:  **None**      Admitting Diagnosis: No admission diagnoses are documented for this encounter  Age/Sex: 61 y o  male    Admission Orders:    Neurovascuar checks    Scheduled Medications:  acetaminophen, 975 mg, Oral, Q8H  cefazolin, 1,000 mg, Intravenous, Q8H  heparin (porcine), 5,000 Units, Subcutaneous, Q8H JOSSELIN  ibuprofen, 600 mg, Oral, Q6H  vancomycin, 20 mg/kg, Intravenous, Q12H      Continuous IV Infusions:  lactated ringers, 75 mL/hr, Intravenous, Continuous      PRN Meds:  cloNIDine, 0 1 mg, Oral, Q6H PRN  haloperidol lactate, 2 mg, Intramuscular, Q6H PRN  hydrOXYzine HCL, 25 mg, Oral, Q6H PRN 9/13 x 1  ketorolac, 15 mg, Intravenous, Q6H PRN 9/13 x 1  loperamide, 2 mg, Oral, TID PRN  ondansetron, 4 mg, Oral, Q6H PRN 9/13 x 1        IP CONSULT TO CASE MANAGEMENT  IP CONSULT TO PHARMACY    Network Utilization Review Department  ATTENTION: Please call with any questions or concerns to [phone] and carefully listen to the prompts so that you are directed to the right person  All voicemails are confidential   Robson Jones all requests for admission clinical reviews, approved or denied determinations and any other requests to dedicated fax number below belonging to the campus where the patient is receiving treatment   List of dedicated fax numbers for the Facilities:  [address] DENIALS (Administrative/Medical Necessity) [phone]   [address] (Maternity/NICU/Pediatrics) 261 [facility] [address] [employer] [place] [address] [place] [address] [place] [address] Lisa Jacobs 1481 [address] [phone]

## 2021-09-13 NOTE — PROGRESS NOTES
INTERNAL MEDICINE RESIDENCY PROGRESS NOTE     Name: Rey Orozco   Age & Sex: 61 y o  male   MRN: [id number]  Unit/Bed#: [facility] 314-01   Encounter: [id number]  Team: SOD Team B     PATIENT INFORMATION     Name: Rey Orozco   Age & Sex: 61 y o  male   MRN: [id number]  Hospital Stay Days: 0    ASSESSMENT/PLAN     Principal Problem:    Cellulitis and abscess of hand  Active Problems:    IV drug abuse ([facility] [address] )    Tobacco abuse      Tobacco abuse  Assessment & Plan  History of tobacco abuse  Patient admits to smoking 1 pack per day  Denies nicotine patch  Plan:  · Continue to encourage smoking cessation    [facility] ([facility] )  Assessment & Plan  History of IV drug abuse  Patient admits to using both cocaine and heroin with last use the morning of 09/12/2021  Patient checks in bilateral upper extremities and is currently admitted for cellulitis and abscess of the right hand likely secondary to IV drug abuse  Plan:  · Monitor for signs of withdrawal  · Conservative, symptomatic care  · Continue to encourage drug use cessation    * Cellulitis and abscess of hand  Assessment & Plan  Male with history of IV drug abuse now presenting with right hand pain and swelling that had been ongoing for 2 days  CTA right upper extremity demonstrating a 3 1 x 1 9 x 3 6 cm subcutaneous fluid collection over the dorsum the right hand overlying the 2nd through 4th metacarpals consistent with abscess  Also noted evidence of cellulitis  Patient now status post I&D completed by Hand surgery  Patient started on IV Cefazolin per hand surgery  However patient with history of MRSA infection with positive wound cultures back in 2020  Therefore, patient also started on vancomycin      Plan:  · POD #1 I&D of right hand  · Continue antibiotics with IV cefazolin and vancomycin  · Will tailor antibiotics pending sensitivities of culture  · Follow-up blood and wound cultures  · Continue to monitor WBC trend fever curve  · Scheduled Tylenol and Motrin for pain control  · Plastic/Hand surgery consulted and appreciate recommendations      Disposition: Pending above; will remain inpatient    SUBJECTIVE     Patient seen and examined  No acute events overnight  Patient states that he is going through withdrawals  He has generalized pain and rhinorrhea  Will manage symptoms  OBJECTIVE     Vitals:    21 1505 21 1515 21 0008 21 0833   BP: 127/75 140/74 122/60 (!) 190/106   BP Location:   Left arm Left arm   Pulse:  70 (!) 51 76   Resp:   14   Temp:  98 °F (36 7 °C) 98 1 °F (36 7 °C) 98 1 °F (36 7 °C)   TempSrc:   Oral Oral   SpO2:  98% 99% 94%      Temperature:   Temp (24hrs), Av 3 °F (36 8 °C), Min:98 °F (36 7 °C), Max:98 9 °F (37 2 °C)    Temperature: 98 1 °F (36 7 °C)  Intake & Output:  I/O        07 -  0700  07 -  0700 701 -  0700    P  O   960     I V   500     Total Intake  1460     Urine  1200     Total Output  1200     Net  +260            Unmeasured Stool Occurrence  1 x         Weights: There is no height or weight on file to calculate BMI  Weight (last 2 days)     None        Physical Exam  Constitutional:       Appearance: Normal appearance  HENT:      Head: Normocephalic and atraumatic  Nose: Nose normal       Mouth/Throat:      Mouth: Mucous membranes are moist       Pharynx: Oropharynx is clear  Eyes:      Extraocular Movements: Extraocular movements intact  Conjunctiva/sclera: Conjunctivae normal    Cardiovascular:      Rate and Rhythm: Normal rate and regular rhythm  Heart sounds: No murmur heard  No friction rub  No gallop  Pulmonary:      Effort: Pulmonary effort is normal  No respiratory distress  Breath sounds: Normal breath sounds  No stridor  No wheezing, rhonchi or rales  Chest:      Chest wall: No tenderness  Abdominal:      General: Bowel sounds are normal  There is no distension  Palpations: Abdomen is soft  There is no mass  Tenderness: There is no abdominal tenderness  There is no guarding or rebound  Hernia: No hernia is present  Musculoskeletal:         General: Normal range of motion  Cervical back: Normal range of motion  Skin:     General: Skin is warm and dry  Neurological:      General: No focal deficit present  Mental Status: He is alert and oriented to person, place, and time  Psychiatric:         Mood and Affect: Mood normal        LABORATORY DATA     Labs: I have personally reviewed pertinent reports  Results from last 7 days   Lab Units 09/11/21 2124 09/11/21  1207   WBC Thousand/uL 9 50 8 80   HEMOGLOBIN g/dL 12 4* 12 4*   HEMATOCRIT % 35 7* 36 5*   PLATELETS Thousands/uL 245 247   NEUTROS PCT % 82* 84*   MONOS PCT % 5 5      Results from last 7 days   Lab Units 09/11/21 2124 09/11/21  1207   POTASSIUM mmol/L 3 6 3 7   CHLORIDE mmol/L 102 102   CO2 mmol/L 31* 32*   BUN mg/dL 10 9   CREATININE mg/dL 0 69* 0 53*   CALCIUM mg/dL 8 7 8 6   ALK PHOS U/L 84 89   ALT U/L 24 25   AST U/L 27 27                  Results from last 7 days   Lab Units 09/11/21 2124   LACTIC ACID mmol/L 1 7           IMAGING & DIAGNOSTIC TESTING     Radiology Results: I have personally reviewed pertinent reports  No results found  Other Diagnostic Testing: I have personally reviewed pertinent reports      ACTIVE MEDICATIONS     Current Facility-Administered Medications   Medication Dose Route Frequency    acetaminophen (TYLENOL) tablet 975 mg  975 mg Oral Q8H    ceFAZolin (ANCEF) IVPB (premix in dextrose) 1,000 mg 50 mL  1,000 mg Intravenous Q8H    cloNIDine (CATAPRES) tablet 0 1 mg  0 1 mg Oral Q6H PRN    haloperidol lactate (HALDOL) injection 2 mg  2 mg Intramuscular Q6H PRN    heparin (porcine) subcutaneous injection 5,000 Units  5,000 Units Subcutaneous Q8H [address]    hydrOXYzine HCL (ATARAX) tablet 25 mg  25 mg Oral Q6H PRN    ibuprofen (MOTRIN) tablet 600 mg  600 mg Oral Q6H    ketorolac (TORADOL) injection 15 mg  15 mg Intravenous Q6H PRN    lactated ringers infusion  75 mL/hr Intravenous Continuous    loperamide (IMODIUM) capsule 2 mg  2 mg Oral TID PRN    ondansetron (ZOFRAN-ODT) dispersible tablet 4 mg  4 mg Oral Q6H PRN    vancomycin (VANCOCIN) 1,250 mg in sodium chloride 0 9 % 250 mL IVPB  20 mg/kg Intravenous Q12H     Portions of the record may have been created with voice recognition software  Occasional wrong word or "sound a like" substitutions may have occurred due to the inherent limitations of voice recognition software    Read the chart carefully and recognize, using context, where substitutions have occurred   ==  Griselda Burow, MD  [address]  Internal Medicine Residency PGY-3

## 2021-09-13 NOTE — ASSESSMENT & PLAN NOTE
History of tobacco abuse  Patient admits to smoking 1 pack per day  Denies nicotine patch      Plan:  · Continue to encourage smoking cessation

## 2021-09-14 ENCOUNTER — HOSPITAL ENCOUNTER (EMERGENCY)
Facility: HOSPITAL | Age: 56
Discharge: HOME | End: 2021-09-14
Attending: EMERGENCY MEDICINE
Payer: MEDICARE

## 2021-09-14 VITALS
OXYGEN SATURATION: 69 % | RESPIRATION RATE: 18 BRPM | TEMPERATURE: 98.1 F | WEIGHT: 143.3 LBS | BODY MASS INDEX: 23.85 KG/M2 | HEART RATE: 67 BPM | DIASTOLIC BLOOD PRESSURE: 80 MMHG | SYSTOLIC BLOOD PRESSURE: 131 MMHG

## 2021-09-14 VITALS
SYSTOLIC BLOOD PRESSURE: 195 MMHG | RESPIRATION RATE: 17 BRPM | TEMPERATURE: 98 F | DIASTOLIC BLOOD PRESSURE: 101 MMHG | HEART RATE: 60 BPM | OXYGEN SATURATION: 98 %

## 2021-09-14 DIAGNOSIS — Z76.0 MEDICATION REFILL: Primary | ICD-10-CM

## 2021-09-14 LAB — BACTERIA SPEC ANAEROBE CULT: NORMAL

## 2021-09-14 PROCEDURE — NC001 PR NO CHARGE: Performed by: PLASTIC SURGERY

## 2021-09-14 PROCEDURE — 99284 EMERGENCY DEPT VISIT MOD MDM: CPT | Performed by: EMERGENCY MEDICINE

## 2021-09-14 PROCEDURE — 99283 EMERGENCY DEPT VISIT LOW MDM: CPT

## 2021-09-14 RX ORDER — DOXYCYCLINE HYCLATE 100 MG/1
100 CAPSULE ORAL ONCE
Status: COMPLETED | OUTPATIENT
Start: 2021-09-14 | End: 2021-09-14

## 2021-09-14 RX ORDER — LABETALOL 20 MG/4 ML (5 MG/ML) INTRAVENOUS SYRINGE
5 ONCE
Status: COMPLETED | OUTPATIENT
Start: 2021-09-14 | End: 2021-09-14

## 2021-09-14 RX ORDER — DOXYCYCLINE HYCLATE 100 MG/1
100 CAPSULE ORAL 2 TIMES DAILY
Qty: 20 CAPSULE | Refills: 0 | Status: SHIPPED | OUTPATIENT
Start: 2021-09-14 | End: 2021-09-24

## 2021-09-14 RX ADMIN — DOXYCYCLINE 100 MG: 100 CAPSULE ORAL at 09:04

## 2021-09-14 RX ADMIN — DOXYCYCLINE 100 MG: 100 CAPSULE ORAL at 18:39

## 2021-09-14 RX ADMIN — IBUPROFEN 600 MG: 600 TABLET, FILM COATED ORAL at 05:36

## 2021-09-14 RX ADMIN — LABETALOL 20 MG/4 ML (5 MG/ML) INTRAVENOUS SYRINGE 5 MG: at 09:04

## 2021-09-14 RX ADMIN — HEPARIN SODIUM 5000 UNITS: 5000 INJECTION INTRAVENOUS; SUBCUTANEOUS at 05:37

## 2021-09-14 RX ADMIN — KETOROLAC TROMETHAMINE 15 MG: 30 INJECTION, SOLUTION INTRAMUSCULAR; INTRAVENOUS at 06:52

## 2021-09-14 RX ADMIN — ACETAMINOPHEN 975 MG: 325 TABLET, FILM COATED ORAL at 09:04

## 2021-09-14 NOTE — PROGRESS NOTES
Progress Note - Plastic Surgery     Name: Ryan Bhat  MRN: [id number]  Unit/Bed#: [facility] 314-01  Encounter: [id number]    Assessment: 61y o  year old male POD #2 s/p I&D dorsum of right hand, considerably improved exam     Continues to do well from plastic surgery standpoint  Plan:  - Start hand soaks to the affected RIGHT hand for 15-20 minutes TID in dilute betadine/saline solution in a pink basin  Continue with local wound care: No need to pack the wound, please dress with dry dressing on top (ABD if needed) and wrap with Kerlix to secure dressing in place per nursing  Order placed in [address] [place] [address] Rd  - Follow-up wound cx from I&D: 3+ alpha hemolytic Strep, follow-up sensitivities  - Agree with antibiotics and remainder of excellent care per primary team/ID  Once antibiotic plan is finalized by primary team based on sensitivities (on doxycycline) may be discharged from plastic surgery standpoint with follow-up in 1-2 weeks  - Plastic Surgery following, please contact me with any questions or concerns  - Patient evaluation and plan discussed with Plastic Surgery attending, Dr Adarsh Lopez, who agrees with the above  Signature: Kathie Cortés MD 9/14/2021 9:02 AM  Plastic Surgery Fellow      Subjective: No fever or chills  Packing removed at the bedside  Objective:   Temp:  [98 °F (36 7 °C)-98 3 °F (36 8 °C)] 98 °F (36 7 °C)  HR:  [54-60] 60  Resp:  [17-18] 17  BP: (125-195)/() 195/101  There is no height or weight on file to calculate BMI  NAD, appears comfortable  Nonlabored breathing  Right dorsal hand incision clean with macerated skin, open wound draining minimal serous  Starting hand soaks  Warm  I have reviewed the patient's labs and imaging      Results from last 7 days   Lab Units 09/11/21 2124 09/11/21  1207   WBC Thousand/uL 9 50 8 80   HEMOGLOBIN g/dL 12 4* 12 4*   PLATELETS Thousands/uL 245 247     Results from last 7 days   Lab Units 09/11/21 2124 09/11/21  1207   SODIUM mmol/L 140 140   POTASSIUM mmol/L 3 6 3 7   CHLORIDE mmol/L 102 102   CO2 mmol/L 31* 32*   BUN mg/dL 10 9   CREATININE mg/dL 0 69* 0 53*   EGFR ml/min/1 73sq m 105 117   CALCIUM mg/dL 8 7 8 6   AST U/L 27 27   ALT U/L 24 25   ALK PHOS U/L 84 89     Results from last 7 days   Lab Units 09/12/21  1432 09/11/21  2124 09/11/21  1223 09/11/21  1207   BLOOD CULTURE   --  No Growth at 24 hrs  No Growth at 24 hrs  No Growth at 48 hrs  No Growth at 48 hrs     GRAM STAIN RESULT  3+ Polys*  3+ Gram positive cocci in pairs and chains*  --   --   --

## 2021-09-14 NOTE — UTILIZATION REVIEW
Inpatient Admission Authorization Request   NOTIFICATION OF INPATIENT ADMISSION/INPATIENT AUTHORIZATION REQUEST   SERVICING FACILITY:   [facility]  Address: [address] [place] [address], [address] [place] [address]  Tax ID: [id number]  NPI: [id number]  Place of Service: Inpatient [facility]  [address]  Place of Service Code: 24     ATTENDING PROVIDER:  Attending Name and NPI#: Kala Diaz Md [[id number]]  Address: [address] [place] [address], [address] [place] [address]  Phone: [phone]     UTILIZATION REVIEW CONTACT:  Harvinder Macedo Utilization   Network Utilization Review Department  Phone: [phone]  Fax: [phone]  Email: [email or web address]     PHYSICIAN ADVISORY SERVICES:  FOR CITD-MJ-NXBU REVIEW - MEDICAL NECESSITY DENIAL  Phone: [phone]  Fax: [phone]  Email: [email or web address]     TYPE OF REQUEST:  Inpatient Status     ADMISSION INFORMATION:  ADMISSION DATE/TIME: 9/13/21  2:58 PM  PATIENT DIAGNOSIS CODE/DESCRIPTION:  No admission diagnoses are documented for this encounter  DISCHARGE DATE/TIME: 9/14/2021 10:45 AM  DISCHARGE DISPOSITION (IF DISCHARGED): Left against medical advice or discontinued care     IMPORTANT INFORMATION:  Please contact the Harvinder Macedo directly with any questions or concerns regarding this request  Department voicemails are confidential     Send requests for admission clinical reviews, concurrent reviews, approvals, and administrative denials due to lack of clinical to fax [phone]

## 2021-09-14 NOTE — DISCHARGE SUMMARY
INTERNAL MEDICINE RESIDENCY DISCHARGE SUMMARY     Karlene Nunez   61 y o  male  MRN: [id number]  Room/Bed: [facility] 314/[facility] [address]    Encounter: [id number]    Principal Problem:    Cellulitis and abscess of hand  Active Problems:    IV drug abuse ([facility] [address] )    Tobacco abuse      Tobacco abuse  Assessment & Plan  History of tobacco abuse  Patient admits to smoking 1 pack per day  Denies nicotine patch  Plan:  · Continue to encourage smoking cessation    IV drug abuse ([facility] )  Assessment & Plan  History of IV drug abuse  Patient admits to using both cocaine and heroin with last use the morning of 09/12/2021  Patient checks in bilateral upper extremities and is currently admitted for cellulitis and abscess of the right hand likely secondary to IV drug abuse  Plan:  · Monitor for signs of withdrawal  · Conservative, symptomatic care  · Continue to encourage drug use cessation    * Cellulitis and abscess of hand  Assessment & Plan  Male with history of IV drug abuse now presenting with right hand pain and swelling that had been ongoing for 2 days  CTA right upper extremity demonstrating a 3 1 x 1 9 x 3 6 cm subcutaneous fluid collection over the dorsum the right hand overlying the 2nd through 4th metacarpals consistent with abscess  Also noted evidence of cellulitis  Patient now status post I&D completed by Hand surgery  Patient started on IV Cefazolin per hand surgery  However patient with history of MRSA infection with positive wound cultures back in 2020  Therefore, patient also started on vancomycin      Plan:  · POD #2 I&D of right hand  · Patient was transitioned to doxycycline as he was refusing blood draws for vanco  · Will tailor antibiotics pending sensitivities of culture  · Follow-up blood and wound cultures  · Continue to monitor WBC trend fever curve  · Scheduled Tylenol and Motrin/Toradol for pain control  · Plastic/Hand surgery consulted and appreciate recommendations        [address] COURSE     Patient is a 25-year-old male with past medical history significant for IV drug use (heroin and cocaine) as well as tobacco use  He presented to [facility] on 09/11/2021 for right hand pain and swelling inside out AMA to do heroin  Patient then presented again and CT of the right upper extremity was ordered and revealed a abscess over the dorsum of the right hand  Hand surgery was consulted and performed an I& D  Patient was started on vancomycin and cefazolin  During his admission, patient was treated for his heroin withdrawals symptomatically  Patient was refusing lab work during his admission  Patient did not want to have an IV  Medications were switched to doxycycline prior to sensitivities as patient was refusing IV medications  On 09/14/2021, patient was seen and examined at bedside  He was doing better  He stated that his withdrawal symptoms are better than prior, however symptoms were not completely controlled  Patient was agreeable to host referral in inpatient rehab  However a few hours later, I was contacted by nursing and told that patient had eloped  He did not have an IV in place  Unable to call patient as no phone number was left in the chart  Review of Systems   Constitutional: Negative  HENT: Negative  Respiratory: Negative  Cardiovascular: Negative  Gastrointestinal: Negative  Genitourinary: Negative  Musculoskeletal: Negative  Neurological: Negative  Physical Exam  Constitutional:       Appearance: Normal appearance  HENT:      Head: Normocephalic and atraumatic  Nose: Nose normal       Mouth/Throat:      Mouth: Mucous membranes are moist       Pharynx: Oropharynx is clear  Eyes:      Extraocular Movements: Extraocular movements intact  Conjunctiva/sclera: Conjunctivae normal    Cardiovascular:      Rate and Rhythm: Normal rate and regular rhythm  Heart sounds:  No murmur heard  No friction rub  No gallop  Pulmonary:      Effort: Pulmonary effort is normal  No respiratory distress  Breath sounds: Normal breath sounds  No stridor  No wheezing, rhonchi or rales  Chest:      Chest wall: No tenderness  Abdominal:      General: Bowel sounds are normal  There is no distension  Palpations: Abdomen is soft  There is no mass  Tenderness: There is no abdominal tenderness  There is no guarding or rebound  Hernia: No hernia is present  Musculoskeletal:         General: Normal range of motion  Cervical back: Normal range of motion  Skin:     General: Skin is warm and dry  Neurological:      General: No focal deficit present  Mental Status: He is alert and oriented to person, place, and time  Psychiatric:         Mood and Affect: Mood normal          DISCHARGE INFORMATION     PCP at Discharge: No PCP    Admitting Provider: Lisa Pichardo MD  Admission Date: 9/12/2021    Discharge Provider: No att  providers found  Discharge Date: 9/14/2021    Discharge Disposition: Left against medical advice or discontinued care  Discharge Condition: stable  Discharge with Lines: no    Discharge Diet: regular diet  Activity Restrictions: none  Test Results Pending at Discharge: Bacterial sensitivities    Discharge Diagnoses:  Principal Problem:    Cellulitis and abscess of hand  Active Problems:    IV drug abuse ([address] )    Tobacco abuse  Resolved Problems:    * No resolved hospital problems  *      Consulting Providers:      Diagnostic & Therapeutic Procedures Performed:  No results found      Code Status: Level 1 - Full Code  Advance Directive & Living Will: <no information>  Power of :    POLST:      Medications:  Discharge Medication List as of 9/14/2021 10:44 AM        Discharge Medication List as of 9/14/2021 10:44 AM        Discharge Medication List as of 9/14/2021 10:44 AM      CONTINUE these medications which have NOT CHANGED    Details diclofenac sodium (VOLTAREN) 50 mg EC tablet Take 1 tablet (50 mg total) by mouth 2 (two) times a day, Starting Tue 1/1/2019, Print      naproxen (NAPROSYN) 500 mg tablet Take 1 tablet (500 mg total) by mouth 2 (two) times a day with meals, Starting Mon 9/9/2019, Print             Allergies: Allergies   Allergen Reactions    Vicodin [Hydrocodone-Acetaminophen]        FOLLOW-UP     PCP Outpatient Follow-up:  1-2 weeks    Discharge Statement:   I spent 30 minutes minutes discharging the patient  This time was spent on the day of discharge  I had direct contact with the patient on the day of discharge  Additional documentation is required if more than 30 minutes were spent on discharge  Portions of the record may have been created with voice recognition software  Occasional wrong word or "sound a like" substitutions may have occurred due to the inherent limitations of voice recognition software    Read the chart carefully and recognize, using context, where substitutions have occurred     ==  Carolyn Bruno MD  [address]  Internal Medicine Resident PGY-3

## 2021-09-15 ENCOUNTER — HOSPITAL ENCOUNTER (EMERGENCY)
Facility: HOSPITAL | Age: 56
Discharge: HOME | End: 2021-09-16
Attending: EMERGENCY MEDICINE | Admitting: EMERGENCY MEDICINE
Payer: MEDICARE

## 2021-09-15 DIAGNOSIS — F11.10 OPIOID ABUSE (HCC): Primary | ICD-10-CM

## 2021-09-15 DIAGNOSIS — R79.89 ELEVATED SERUM CREATININE: ICD-10-CM

## 2021-09-15 DIAGNOSIS — Z87.828 H/O OPEN HAND WOUND: ICD-10-CM

## 2021-09-15 LAB
ALBUMIN SERPL BCP-MCNC: 2.7 G/DL (ref 3.5–5)
ALP SERPL-CCNC: 88 U/L (ref 46–116)
ALT SERPL W P-5'-P-CCNC: 23 U/L (ref 12–78)
AMPHETAMINES SERPL QL SCN: NEGATIVE
ANION GAP SERPL CALCULATED.3IONS-SCNC: 4 MMOL/L (ref 4–13)
AST SERPL W P-5'-P-CCNC: 20 U/L (ref 5–45)
BARBITURATES UR QL: NEGATIVE
BASOPHILS # BLD AUTO: 0.04 THOUSANDS/ΜL (ref 0–0.1)
BASOPHILS NFR BLD AUTO: 1 % (ref 0–1)
BENZODIAZ UR QL: NEGATIVE
BILIRUB SERPL-MCNC: 0.28 MG/DL (ref 0.2–1)
BUN SERPL-MCNC: 17 MG/DL (ref 5–25)
CALCIUM ALBUM COR SERPL-MCNC: 9.8 MG/DL (ref 8.3–10.1)
CALCIUM SERPL-MCNC: 8.8 MG/DL (ref 8.3–10.1)
CHLORIDE SERPL-SCNC: 101 MMOL/L (ref 100–108)
CO2 SERPL-SCNC: 32 MMOL/L (ref 21–32)
COCAINE UR QL: POSITIVE
CREAT SERPL-MCNC: 1.2 MG/DL (ref 0.6–1.3)
EOSINOPHIL # BLD AUTO: 0.19 THOUSAND/ΜL (ref 0–0.61)
EOSINOPHIL NFR BLD AUTO: 2 % (ref 0–6)
ERYTHROCYTE [DISTWIDTH] IN BLOOD BY AUTOMATED COUNT: 12.1 % (ref 11.6–15.1)
ETHANOL EXG-MCNC: 0 MG/DL
GFR SERPL CREATININE-BSD FRML MDRD: 67 ML/MIN/1.73SQ M
GLUCOSE SERPL-MCNC: 92 MG/DL (ref 65–140)
HCT VFR BLD AUTO: 35 % (ref 36.5–49.3)
HGB BLD-MCNC: 11.5 G/DL (ref 12–17)
IMM GRANULOCYTES # BLD AUTO: 0.04 THOUSAND/UL (ref 0–0.2)
IMM GRANULOCYTES NFR BLD AUTO: 1 % (ref 0–2)
LYMPHOCYTES # BLD AUTO: 2.13 THOUSANDS/ΜL (ref 0.6–4.47)
LYMPHOCYTES NFR BLD AUTO: 26 % (ref 14–44)
MCH RBC QN AUTO: 30 PG (ref 26.8–34.3)
MCHC RBC AUTO-ENTMCNC: 32.9 G/DL (ref 31.4–37.4)
MCV RBC AUTO: 91 FL (ref 82–98)
METHADONE UR QL: NEGATIVE
MONOCYTES # BLD AUTO: 0.53 THOUSAND/ΜL (ref 0.17–1.22)
MONOCYTES NFR BLD AUTO: 6 % (ref 4–12)
NEUTROPHILS # BLD AUTO: 5.38 THOUSANDS/ΜL (ref 1.85–7.62)
NEUTS SEG NFR BLD AUTO: 64 % (ref 43–75)
NRBC BLD AUTO-RTO: 0 /100 WBCS
OPIATES UR QL SCN: POSITIVE
OXYCODONE+OXYMORPHONE UR QL SCN: NEGATIVE
PCP UR QL: NEGATIVE
PLATELET # BLD AUTO: 235 THOUSANDS/UL (ref 149–390)
PMV BLD AUTO: 7.8 FL (ref 8.9–12.7)
POTASSIUM SERPL-SCNC: 3.6 MMOL/L (ref 3.5–5.3)
PROT SERPL-MCNC: 7.2 G/DL (ref 6.4–8.2)
RBC # BLD AUTO: 3.83 MILLION/UL (ref 3.88–5.62)
SARS-COV-2 RNA RESP QL NAA+PROBE: NEGATIVE
SODIUM SERPL-SCNC: 137 MMOL/L (ref 136–145)
THC UR QL: POSITIVE
TSH SERPL DL<=0.05 MIU/L-ACNC: 1.38 UIU/ML (ref 0.36–3.74)
WBC # BLD AUTO: 8.31 THOUSAND/UL (ref 4.31–10.16)

## 2021-09-15 PROCEDURE — 80307 DRUG TEST PRSMV CHEM ANLYZR: CPT | Performed by: STUDENT IN AN ORGANIZED HEALTH CARE EDUCATION/TRAINING PROGRAM

## 2021-09-15 PROCEDURE — 80053 COMPREHEN METABOLIC PANEL: CPT | Performed by: STUDENT IN AN ORGANIZED HEALTH CARE EDUCATION/TRAINING PROGRAM

## 2021-09-15 PROCEDURE — U0003 INFECTIOUS AGENT DETECTION BY NUCLEIC ACID (DNA OR RNA); SEVERE ACUTE RESPIRATORY SYNDROME CORONAVIRUS 2 (SARS-COV-2) (CORONAVIRUS DISEASE [COVID-19]), AMPLIFIED PROBE TECHNIQUE, MAKING USE OF HIGH THROUGHPUT TECHNOLOGIES AS DESCRIBED BY CMS-2020-01-R: HCPCS | Performed by: STUDENT IN AN ORGANIZED HEALTH CARE EDUCATION/TRAINING PROGRAM

## 2021-09-15 PROCEDURE — U0005 INFEC AGEN DETEC AMPLI PROBE: HCPCS | Performed by: STUDENT IN AN ORGANIZED HEALTH CARE EDUCATION/TRAINING PROGRAM

## 2021-09-15 PROCEDURE — 99284 EMERGENCY DEPT VISIT MOD MDM: CPT

## 2021-09-15 PROCEDURE — 36415 COLL VENOUS BLD VENIPUNCTURE: CPT | Performed by: STUDENT IN AN ORGANIZED HEALTH CARE EDUCATION/TRAINING PROGRAM

## 2021-09-15 PROCEDURE — 99284 EMERGENCY DEPT VISIT MOD MDM: CPT | Performed by: EMERGENCY MEDICINE

## 2021-09-15 PROCEDURE — 82075 ASSAY OF BREATH ETHANOL: CPT | Performed by: STUDENT IN AN ORGANIZED HEALTH CARE EDUCATION/TRAINING PROGRAM

## 2021-09-15 PROCEDURE — 84443 ASSAY THYROID STIM HORMONE: CPT | Performed by: STUDENT IN AN ORGANIZED HEALTH CARE EDUCATION/TRAINING PROGRAM

## 2021-09-15 PROCEDURE — 85025 COMPLETE CBC W/AUTO DIFF WBC: CPT | Performed by: STUDENT IN AN ORGANIZED HEALTH CARE EDUCATION/TRAINING PROGRAM

## 2021-09-15 NOTE — ED PROVIDER NOTES
History  Chief Complaint   Patient presents with    Hand Problem     Pt was admitted for abscess in right hand last week  Left hospital today  He would like abx  Patient is a 70-year-old right hand dominant male here requesting antibiotics  Patient was seen here earlier this week and sent to [facility] and had an I&D by hand surgery  States left before getting his discharge  States tired of getting stuck by nurses  Denies any subsequent use today after leaving the hospital   Per review of chart, patient was to be placed on doxy  Prior to Admission Medications   Prescriptions Last Dose Informant Patient Reported? Taking?   diclofenac sodium (VOLTAREN) 50 mg EC tablet   No No   Sig: Take 1 tablet (50 mg total) by mouth 2 (two) times a day   Patient not taking: Reported on 9/11/2021   naproxen (NAPROSYN) 500 mg tablet   No No   Sig: Take 1 tablet (500 mg total) by mouth 2 (two) times a day with meals   Patient not taking: Reported on 9/11/2021      Facility-Administered Medications: None       Past Medical History:   Diagnosis Date    Asthma        Past Surgical History:   Procedure Laterality Date    INCISION AND DRAINAGE OF WOUND Right 9/12/2021    Procedure: INCISION AND DRAINAGE (I&D) EXTREMITY;  Surgeon: Lianna Drew MD;  Location: [facility];  Service: Plastics       History reviewed  No pertinent family history  I have reviewed and agree with the history as documented  E-Cigarette/Vaping    E-Cigarette Use Never User      E-Cigarette/Vaping Substances     Social History     Tobacco Use    Smoking status: Current Every Day Smoker     Packs/day: 1 00    Smokeless tobacco: Never Used   Vaping Use    Vaping Use: Never used   Substance Use Topics    Alcohol use: Never    Drug use: Yes     Types: Marijuana, Heroin, Cocaine     Comment: States he "uses about everything"       Review of Systems   Constitutional: Negative  HENT: Negative  Eyes: Negative      Respiratory: Negative  Cardiovascular: Negative  Gastrointestinal: Negative  Endocrine: Negative  Genitourinary: Negative  Musculoskeletal: Negative  Skin: Negative  Allergic/Immunologic: Negative  Neurological: Negative  Hematological: Negative  Psychiatric/Behavioral: Negative  All other systems reviewed and are negative  Physical Exam  Physical Exam  Vitals and nursing note reviewed  Constitutional:       Appearance: Normal appearance  He is normal weight  HENT:      Head: Normocephalic and atraumatic  Cardiovascular:      Rate and Rhythm: Normal rate and regular rhythm  Pulses: Normal pulses  Heart sounds: Normal heart sounds  Pulmonary:      Effort: Pulmonary effort is normal       Breath sounds: Normal breath sounds  Musculoskeletal:         General: Tenderness present  No swelling  Normal range of motion  Cervical back: Normal range of motion and neck supple  Comments: Mild ttp over incision area   Skin:     General: Skin is warm  Capillary Refill: Capillary refill takes less than 2 seconds  Comments: Linear open incision on dorsum of right hand  Clean margins  No warmth, no fluctuance, no drainage  Neurological:      General: No focal deficit present  Mental Status: He is alert and oriented to person, place, and time           Vital Signs  ED Triage Vitals   Temperature Pulse Respirations Blood Pressure SpO2   09/14/21 1819 09/14/21 1815 09/14/21 1815 09/14/21 1815 09/14/21 1815   98 1 °F (36 7 °C) 67 18 131/80 (!) 69 %      Temp Source Heart Rate Source Patient Position - Orthostatic VS BP Location FiO2 (%)   09/14/21 1819 09/14/21 1815 09/14/21 1815 09/14/21 1815 --   Tympanic Monitor Sitting Left arm       Pain Score       --                  Vitals:    09/14/21 1815   BP: 131/80   Pulse: 67   Patient Position - Orthostatic VS: Sitting         Visual Acuity      ED Medications  Medications   doxycycline hyclate (VIBRAMYCIN) capsule 100 mg (100 mg Oral Given 9/14/21 1839)       Diagnostic Studies  Results Reviewed     None                 No orders to display              Procedures  Procedures         ED Course  ED Course as of Sep 14 2007   Tue Sep 14, 2021   1824 Hand dressing taken off and new dressing applied by me                                              MDM    Disposition  Final diagnoses:   Medication refill     Time reflects when diagnosis was documented in both MDM as applicable and the Disposition within this note     Time User Action Codes Description Comment    9/14/2021  6:20 PM Iesha Daily Add [Z76 0] Medication refill       ED Disposition     ED Disposition Condition Date/Time Comment    Discharge Stable Tue Sep 14, 2021  6:20 PM Lacyserg Wallaces discharge to home/self care  Follow-up Information     Follow up With Specialties Details Why [address], MD Plastic Surgery, [address]  [address]  [phone]            Discharge Medication List as of 9/14/2021  6:21 PM      START taking these medications    Details   doxycycline hyclate (VIBRAMYCIN) 100 mg capsule Take 1 capsule (100 mg total) by mouth 2 (two) times a day for 10 days, Starting Tue 9/14/2021, Until Fri 9/24/2021, Normal         CONTINUE these medications which have NOT CHANGED    Details   diclofenac sodium (VOLTAREN) 50 mg EC tablet Take 1 tablet (50 mg total) by mouth 2 (two) times a day, Starting Tue 1/1/2019, Print      naproxen (NAPROSYN) 500 mg tablet Take 1 tablet (500 mg total) by mouth 2 (two) times a day with meals, Starting Mon 9/9/2019, Print           No discharge procedures on file      PDMP Review     None          ED Provider  Electronically Signed by           Winter Woodard MD  09/14/21 2007 Statement Selected

## 2021-09-15 NOTE — ED NOTES
Pt belongings: white long sleeve, black sweatpants, pair of shoes, gray bag with pair of shoes inside, and pair of socks, cigarettes, lighter, cap, and eyeglasses        Jorge Luke  09/15/21 1948       Scott Martinez  09/15/21 2059

## 2021-09-15 NOTE — ED PROVIDER NOTES
History  Chief Complaint   Patient presents with    Hand Injury     had surgery done on right hand for abscess here on Sat  Pt D/C himself - pt states he is a drug user and now would like to see the doctor so he can get clean and have hand looked at so he can attend a half way house      HPI  79-year-old male past medical history of IV drug abuse and recent I and D of his right hand presents for medical clearance for drug rehab  On 09/12/2021 patient had surgical I and D abscess on his right hand and left AMA from the hospital yesterday  The patient has been taking doxycycline  Today the patient presents for medical clearance for rehab and does not have any hand complaints  He denies any fevers, chills, nausea, vomiting, chest pain, shortness of breath, abdominal pain, numbness and tingling in the hands, hand weakness, joint pain  Patient states last time he used heroin was yesterday by insufflation  He denies any other drug use or alcohol use  He admits to tobacco abuse  He denies any SI HI or audiovisual hallucinations  Prior to Admission Medications   Prescriptions Last Dose Informant Patient Reported?  Taking?   diclofenac sodium (VOLTAREN) 50 mg EC tablet   No No   Sig: Take 1 tablet (50 mg total) by mouth 2 (two) times a day   Patient not taking: Reported on 9/11/2021   doxycycline hyclate (VIBRAMYCIN) 100 mg capsule   No No   Sig: Take 1 capsule (100 mg total) by mouth 2 (two) times a day for 10 days   naproxen (NAPROSYN) 500 mg tablet   No No   Sig: Take 1 tablet (500 mg total) by mouth 2 (two) times a day with meals   Patient not taking: Reported on 9/11/2021      Facility-Administered Medications: None       Past Medical History:   Diagnosis Date    Asthma     Heroin user        Past Surgical History:   Procedure Laterality Date    INCISION AND DRAINAGE OF WOUND Right 9/12/2021    Procedure: INCISION AND DRAINAGE (I&D) EXTREMITY;  Surgeon: Michel Conti MD;  Location: [facility]; Service: Plastics       History reviewed  No pertinent family history  I have reviewed and agree with the history as documented  E-Cigarette/Vaping    E-Cigarette Use Never User      E-Cigarette/Vaping Substances     Social History     Tobacco Use    Smoking status: Current Every Day Smoker     Packs/day: 1 00    Smokeless tobacco: Never Used   Vaping Use    Vaping Use: Never used   Substance Use Topics    Alcohol use: Never    Drug use: Yes     Types: Marijuana, Heroin, Cocaine, "Crack" cocaine     Comment: States he "uses about everything"        Review of Systems   Constitutional: Negative for chills and fever  HENT: Negative for congestion, ear pain, rhinorrhea and sore throat  Eyes: Negative for pain  Respiratory: Negative for apnea, cough, choking, chest tightness, shortness of breath, wheezing and stridor  Cardiovascular: Negative for chest pain, palpitations and leg swelling  Gastrointestinal: Negative for abdominal pain, constipation, diarrhea, nausea and vomiting  Genitourinary: Negative for hematuria  Musculoskeletal: Negative for arthralgias and back pain  Skin: Positive for wound  Negative for rash  Neurological: Negative for dizziness  Psychiatric/Behavioral: Negative for agitation and hallucinations  All other systems reviewed and are negative  Physical Exam  ED Triage Vitals [09/15/21 1733]   Temperature Pulse Respirations Blood Pressure SpO2   97 7 °F (36 5 °C) 73 18 115/78 98 %      Temp Source Heart Rate Source Patient Position - Orthostatic VS BP Location FiO2 (%)   Tympanic Monitor Sitting Left arm --      Pain Score       4             Orthostatic Vital Signs  Vitals:    09/15/21 1733 09/15/21 2047 09/15/21 2200   BP: 115/78 107/57 100/58   Pulse: 73 69 (!) 54   Patient Position - Orthostatic VS: Sitting         Physical Exam  Vitals reviewed  Constitutional:       General: He is not in acute distress  Appearance: He is well-developed     HENT: Head: Normocephalic and atraumatic  Right Ear: External ear normal       Left Ear: External ear normal       Nose: Nose normal       Mouth/Throat:      Mouth: Mucous membranes are moist    Eyes:      Extraocular Movements: Extraocular movements intact  Conjunctiva/sclera: Conjunctivae normal       Pupils: Pupils are equal, round, and reactive to light  Cardiovascular:      Rate and Rhythm: Normal rate and regular rhythm  Heart sounds: Normal heart sounds  Pulmonary:      Effort: Pulmonary effort is normal  No respiratory distress  Breath sounds: Normal breath sounds  No wheezing or rales  Abdominal:      Palpations: Abdomen is soft  Musculoskeletal:         General: Normal range of motion  Cervical back: Normal range of motion and neck supple  No rigidity  Comments: Patient has open wound on the dorsal aspect of the right hand without visual drainage    Motor and sensory branches of median, ulnar, radial nerves of bilateral hands are intact       Skin:     General: Skin is warm  Neurological:      General: No focal deficit present  Mental Status: He is alert and oriented to person, place, and time  Sensory: No sensory deficit     Psychiatric:         Mood and Affect: Mood normal          ED Medications  Medications - No data to display    Diagnostic Studies  Results Reviewed     Procedure Component Value Units Date/Time    Novel Coronavirus (Covid-19),PCR [facility] - 2 Hour Stat [[id number]]  (Normal) Collected: 09/15/21 2043    Lab Status: Final result Specimen: Nares from Nose Updated: 09/15/21 2217     SARS-CoV-2 Negative    Narrative:           Rapid drug screen, urine [[id number]]  (Abnormal) Collected: 09/15/21 2054    Lab Status: Final result Specimen: Urine, Clean Catch Updated: 09/15/21 2207     Amph/Meth UR Negative     Barbiturate Ur Negative     Benzodiazepine Urine Negative     Cocaine Urine Positive     Methadone Urine Negative     Opiate Urine Positive PCP Ur Negative     THC Urine Positive     Oxycodone Urine Negative    Narrative:      Presumptive report  If requested, specimen will be sent to reference lab for confirmation  FOR MEDICAL PURPOSES ONLY  IF CONFIRMATION NEEDED PLEASE CONTACT THE LAB WITHIN 5 DAYS      Drug Screen Cutoff Levels:  AMPHETAMINE/METHAMPHETAMINES  1000 ng/mL  BARBITURATES     200 ng/mL  BENZODIAZEPINES     200 ng/mL  COCAINE      300 ng/mL  METHADONE      300 ng/mL  OPIATES      300 ng/mL  PHENCYCLIDINE     25 ng/mL  THC       50 ng/mL  OXYCODONE      100 ng/mL    Comprehensive metabolic panel [[id number]]  (Abnormal) Collected: 09/15/21 2055    Lab Status: Final result Specimen: Blood from Arm, Left Updated: 09/15/21 2135     Sodium 137 mmol/L      Potassium 3 6 mmol/L      Chloride 101 mmol/L      CO2 32 mmol/L      ANION GAP 4 mmol/L      BUN 17 mg/dL      Creatinine 1 20 mg/dL      Glucose 92 mg/dL      Calcium 8 8 mg/dL      Corrected Calcium 9 8 mg/dL      AST 20 U/L      ALT 23 U/L      Alkaline Phosphatase 88 U/L      Total Protein 7 2 g/dL      Albumin 2 7 g/dL      Total Bilirubin 0 28 mg/dL      eGFR 67 ml/min/1 73sq m     Narrative:      [place] guidelines for Chronic Kidney Disease (CKD):     Stage 1 with normal or high GFR (GFR > 90 mL/min/1 73 square meters)    Stage 2 Mild CKD (GFR = 60-89 mL/min/1 73 square meters)    Stage 3A Moderate CKD (GFR = 45-59 mL/min/1 73 square meters)    Stage 3B Moderate CKD (GFR = 30-44 mL/min/1 73 square meters)    Stage 4 Severe CKD (GFR = 15-29 mL/min/1 73 square meters)    Stage 5 End Stage CKD (GFR <15 mL/min/1 73 square meters)  Note: GFR calculation is accurate only with a steady state creatinine    TSH [[id number]]  (Normal) Collected: 09/15/21 2055    Lab Status: Final result Specimen: Blood from Arm, Left Updated: 09/15/21 2135     TSH 3RD GENERATON 1 380 uIU/mL     Narrative:      Patients undergoing fluorescein dye angiography may retain small amounts of fluorescein in the body for 48-72 hours post procedure  Samples containing fluorescein can produce falsely depressed TSH values  If the patient had this procedure,a specimen should be resubmitted post fluorescein clearance  CBC and differential [[id number]]  (Abnormal) Collected: 09/15/21 2055    Lab Status: Final result Specimen: Blood from Arm, Left Updated: 09/15/21 2105     WBC 8 31 Thousand/uL      RBC 3 83 Million/uL      Hemoglobin 11 5 g/dL      Hematocrit 35 0 %      MCV 91 fL      MCH 30 0 pg      MCHC 32 9 g/dL      RDW 12 1 %      MPV 7 8 fL      Platelets 043 Thousands/uL      nRBC 0 /100 WBCs      Neutrophils Relative 64 %      Immat GRANS % 1 %      Lymphocytes Relative 26 %      Monocytes Relative 6 %      Eosinophils Relative 2 %      Basophils Relative 1 %      Neutrophils Absolute 5 38 Thousands/µL      Immature Grans Absolute 0 04 Thousand/uL      Lymphocytes Absolute 2 13 Thousands/µL      Monocytes Absolute 0 53 Thousand/µL      Eosinophils Absolute 0 19 Thousand/µL      Basophils Absolute 0 04 Thousands/µL     POCT alcohol breath test [[id number]]  (Normal) Resulted: 09/15/21 2056    Lab Status: Final result Updated: 09/15/21 2056     EXTBreath Alcohol 0 000                 No orders to display         Procedures  Procedures      ED Course  ED Course as of Sep 15 2251   Wed Sep 15, 2021   2206 Medically cleared for [facility]                                SBIRT 22yo+      Most Recent Value   SBIRT (25 yo +)   In order to provide better care to our patients, we are screening all of our patients for alcohol and drug use  Would it be okay to ask you these screening questions? No Filed at: 09/15/2021 1937   Initial Alcohol Screen: US AUDIT-C    1  How often do you have a drink containing alcohol? 6 Filed at: 09/15/2021 1937   2  How many drinks containing alcohol do you have on a typical day you are drinking? 3 Filed at: 09/15/2021 1937   3a  Male UNDER 65:  How often do you have five or more drinks on one occasion? 5 Filed at: 09/15/2021 1937   Audit-C Score  (!) 14 Filed at: 09/15/2021 1937                MDM  48-year-old male past medical history of IV drug abuse and recent I and D of his right hand presents for medical clearance for drug rehab  Plastics contacted- from their standpoint hand wound seems to be healing appropriately and no intervention is needed at this time  Patient will continue taking doxycycline    Patient has elevated creatinine with preserved kidney function patient is told to follow-up with primary care doctor and drink plenty of fluids  Patient is cleared for [facility]     Disposition  Final diagnoses:   Opioid abuse ([facility] )   H/O open hand wound   Elevated serum creatinine     Time reflects when diagnosis was documented in both MDM as applicable and the Disposition within this note     Time User Action Codes Description Comment    9/15/2021 10:47 PM Belvie Libman Add [F11 10] Opioid abuse ([facility] [address] )     9/15/2021 10:48 PM Belvie Libman Add [E84 313] H/O open hand wound     9/15/2021 10:48 PM Belvie Libman Add [N17 9] ANABELL (acute kidney injury) ([facility] )     9/15/2021 10:50 PM Isael Scruggs [N17 9] ANABELL (acute kidney injury) ([facility] [address] )     9/15/2021 10:50 PM Belvie Libman Add [R79 89] Elevated serum creatinine       ED Disposition     ED Disposition Condition Date/Time Comment    Discharge Stable Wed Sep 15, 2021 10:47 PM Fallon Swenson discharge to [facility]          Follow-up Information    None         Patient's Medications   Discharge Prescriptions    No medications on file     No discharge procedures on file  PDMP Review     None           ED Provider  Attending physically available and evaluated Fallon Swenson I managed the patient along with the ED Attending      Electronically Signed by         Nava Mills DO  09/15/21 6123

## 2021-09-15 NOTE — ED NOTES
Pt states he wishes to go to inpatient rehab  He says he uses whatever drugs he can get  He admits to alcohol, cocaine, and heroin use       Shyam Harley RN  09/15/21 1944

## 2021-09-15 NOTE — ED ATTENDING ATTESTATION
9/15/2021  IDenzel Arm, MD, saw and evaluated the patient  I have discussed the patient with the resident/non-physician practitioner and agree with the resident's/non-physician practitioner's findings, Plan of Care, and MDM as documented in the resident's/non-physician practitioner's note, except where noted  All available labs and Radiology studies were reviewed  I was present for key portions of any procedure(s) performed by the resident/non-physician practitioner and I was immediately available to provide assistance  At this point I agree with the current assessment done in the Emergency Department  I have conducted an independent evaluation of this patient a history and physical is as follows:  Left-hand dominant male with history of IV drug use, over the weekend was seen for hand abscess which was I indeed  Patient was admitted to the hospital, but signed out [facility]  States that he has been compliant with his doxycycline, and went to work today where he works as a   Patient returns because he wants to have supplies to clean out the wound and also to get cleared for rehab  Patient denies fevers  He states his pain is a 4/10  He states that he washed the wound out in the sink  He has not noted significant drainage  On exam, wound appears as pictures in the chart  Approximately 3 centimeters in length, full sick thickness, no evidence of cellulitis or draining purulence  No fluctuance or pocket  Neurovascularly intact  Impression:  Status post I and D, open wound    Will plan to discuss with orthopedics, discussed with crisis and referral to host  ED Course         Critical Care Time  Procedures

## 2021-09-16 VITALS
BODY MASS INDEX: 23.8 KG/M2 | HEART RATE: 59 BPM | RESPIRATION RATE: 18 BRPM | SYSTOLIC BLOOD PRESSURE: 133 MMHG | WEIGHT: 143 LBS | TEMPERATURE: 97.7 F | DIASTOLIC BLOOD PRESSURE: 84 MMHG | OXYGEN SATURATION: 100 %

## 2021-09-16 LAB
BACTERIA BLD CULT: NORMAL
BACTERIA BLD CULT: NORMAL
BACTERIA TISS AEROBE CULT: ABNORMAL
GRAM STN SPEC: ABNORMAL
GRAM STN SPEC: ABNORMAL

## 2021-09-16 RX ORDER — BUPRENORPHINE AND NALOXONE 8; 2 MG/1; MG/1
8 FILM, SOLUBLE BUCCAL; SUBLINGUAL DAILY
Status: DISCONTINUED | OUTPATIENT
Start: 2021-09-16 | End: 2021-09-16

## 2021-09-16 RX ORDER — DOXYCYCLINE HYCLATE 100 MG/1
100 CAPSULE ORAL EVERY 12 HOURS SCHEDULED
Status: DISCONTINUED | OUTPATIENT
Start: 2021-09-16 | End: 2021-09-16 | Stop reason: HOSPADM

## 2021-09-16 RX ADMIN — DOXYCYCLINE 100 MG: 100 CAPSULE ORAL at 11:20

## 2021-09-16 NOTE — ED NOTES
Spoke with radha from host awaiting pu time to transport pt to [facility]      [place] CHANTELLE Tompkins  09/16/21 2044

## 2021-09-16 NOTE — ED NOTES
Spoke with Summer from [facility]  Pt has bed on HOLD at [facility]  Pt given number and direction to call for prescre/ [place] will then arrange transport and let ED know   If unable to reach [facility] about transport call Anna Melissa from [facility] back at 001 Jacob Nayak RN  09/16/21 6003

## 2021-09-16 NOTE — CONSULTS
Condition:: Bump on lower lip\\nItchiness around crotch area Orthopedics   Felice Lopes 53 y o  male MRN: [id number]  Unit/Bed#: ED 11      Chief Complaint:   left index finger pain    HPI:   53 y  o male IVDA complaining of left index finger erythema and pain  Patient states this began approximately 2 weeks ago he had a splinter stuck in the hand  At that time he has developed redness and pain to the finger, which has not significantly improved or worsened  He has attempted to drain the finger by poking with a needle, which express some pus, but did not improve his symptoms  Patient is left-hand dominant and works as a   Patient has a past medical history of IV drug abuse, particularly active daily heroin use  Review Of Systems:   · Skin:  See above  · Neuro: See HPI  · Musculoskeletal: See HPI  · 14 point review of systems negative except as stated above     Past Medical History:   History reviewed  No pertinent past medical history  Past Surgical History:   History reviewed  No pertinent surgical history  Family History:  Family history reviewed and non-contributory  History reviewed  No pertinent family history      Social History:  Social History     Socioeconomic History    Marital status: Single     Spouse name: None    Number of children: None    Years of education: None    Highest education level: None   Occupational History    None   Social Needs    Financial resource strain: None    Food insecurity:     Worry: None     Inability: None    Transportation needs:     Medical: None     Non-medical: None   Tobacco Use    Smoking status: Current Every Day Smoker     Packs/day: 1 00    Smokeless tobacco: Never Used   Substance and Sexual Activity    Alcohol use: Never     Frequency: Never    Drug use: Yes     Types: Marijuana, Heroin    Sexual activity: None   Lifestyle    Physical activity:     Days per week: None     Minutes per session: None    Stress: None   Relationships    Social connections:     Talks on phone: None Gets together: None     Attends Druze service: None     Active member of club or organization: None     Attends meetings of clubs or organizations: None     Relationship status: None    Intimate partner violence:     Fear of current or ex partner: None     Emotionally abused: None     Physically abused: None     Forced sexual activity: None   Other Topics Concern    None   Social History Narrative    None       Allergies: Allergies   Allergen Reactions    Vicodin [Hydrocodone-Acetaminophen]            Labs:  0   Lab Value Date/Time    HCT 37 9 (L) 01/26/2020 1634    HCT 40 6 (L) 09/19/2019 1129    HCT 34 7 (L) 09/09/2019 1456    HGB 12 8 (L) 01/26/2020 1634    HGB 13 9 09/19/2019 1129    HGB 11 9 (L) 09/09/2019 1456    INR 0 99 09/09/2019 1456    WBC 6 30 01/26/2020 1634    WBC 9 00 09/19/2019 1129    WBC 7 60 09/09/2019 1456    ESR 18 (H) 09/19/2019 1129    CRP 6 5 09/19/2019 1129       Meds:  No current facility-administered medications for this encounter  Current Outpatient Medications:     diclofenac sodium (VOLTAREN) 50 mg EC tablet, Take 1 tablet (50 mg total) by mouth 2 (two) times a day, Disp: 10 tablet, Rfl: 0    naproxen (NAPROSYN) 500 mg tablet, Take 1 tablet (500 mg total) by mouth 2 (two) times a day with meals, Disp: 30 tablet, Rfl: 0    Blood Culture:   No results found for: BLOODCX    Wound Culture:   No results found for: WOUNDCULT    Ins and Outs:  No intake/output data recorded  Physical Exam:   /68   Pulse (!) 52   Resp 18   SpO2 97%   Gen: Alert and oriented to person, place, time  HEENT: EOMI, eyes clear, moist mucus membranes, hearing intact  Respiratory: Bilateral chest rise   No audible wheezing found  Cardiovascular: Regular Rate and Rhythm  Abdomen: soft nontender/nondistended  Musculoskeletal: left Upper Extremity  · Skin: Erythema circumferentially around the index finger from the PIP level and distal   Small area scaling to the ulnar aspect of the Please Describe Your Condition:: Bump on lower lip - initially appeared a few weeks ago.\\nAsymptomatic.\\n\\nItchiness around crotch area for awhile.\\nJust uses Vaseline PRN. skin overlying the distal phalanx, small pustule at the base  See clinical image  · Patient able to flex and extend the DIP against resistance, patient a also able to flex and extend at the PIP, MCP, and wrist   · No fixed flexion posturing, no pain with percussion of flexor tendon sheath, no fusiform swelling, no pain with passive extension of the digit (0/4 kanavel signs)  · Sensation intact Radial, Ulna and Median   · Motor intact M/R/U/ain/pin  · 2+ radial pulse    Radiology:   I personally reviewed the films  X-rays of left index finger show no fracture, dislocation, or foreign body    _*_*_*_*_*_*_*_*_*_*_*_*_*_*_*_*_*_*_*_*_*_*_*_*_*_*_*_*_*_*_*_*_*_*_*_*_*_*_*_*_*    Procedure- Orthopedics   Aranza Pérez 53 y o  male MRN: [id number]  Unit/Bed#: ED 11    Procedure: left index finger felon decompression    After sterile preparation of the skin overlying the finger, local anesthetic was provided with 5cc of 1% lidocaine via a digital block  A radial based incision was made over the distal phalanx  Hemostats were then entered into the wound to break up septations  A small amount of purulent material was expressed from the fingertip  An 11 blade scalpel was then placed under the eponychial fold given the erythema at the base of the fingernail, but no pus was expressible from this area  The hand was irrigated and sterile dressing was then applied  Pt tolerated the procedure well and was neurovascularly intact both pre and post procedure  Meredith Dacosta MD      _*_*_*_*_*_*_*_*_*_*_*_*_*_*_*_*_*_*_*_*_*_*_*_*_*_*_*_*_*_*_*_*_*_*_*_*_*_*_*_*_*      Assessment:  53 y  o male with  left index finger felon, s/p bedside felon decompression  No plan for any operative intervention at this point, patient can be discharged after a dose of IV abx here in the ED with 1 wk of keflex upon discharge  He should follow up in the office within this next week for repeat examination       Plan:   · Ancef x1, keflex x 7-10 days  · Nonweight bearing to left upper extremity  · Analgesics for pain  · Dispo: Ok for discharge from ortho perspective      Sharita Mansfield MD

## 2021-09-16 NOTE — ED NOTES
Phone call placed to [facility] spoke with Travis Campbell, who stated there was a bed available at [facility] for patient to be referred to  Patient was given phone to complete phone assessment with [facility]           Medical information was faxed to Host as per their request

## 2021-09-16 NOTE — ED NOTES
Patient spoke with [facility] in [place], patient reports they have bed on hold for him in the morning  Phone call placed to [facility] admission to confirm ([phone] option [phone] then option 1) spoke with Svitlana Castellon who reported their systems were down for routine maintenance so he could not confirm admission, but states there is a good possibility as their was beds available at that site earlier  He recommended calling first thing in the morning         Follow up with [facility] as well in the morning at [phone]

## 2021-09-16 NOTE — ED NOTES
Spoke with Anna Melissa from [facility], pt denied at [facility] due to wound on hand   Awaiting to hear back from [facility] and then [facility] will call RN back with updated information      [place] CHANTELLE Echevarria  09/16/21 4197

## 2021-09-16 NOTE — ED NOTES
All belongings returned to pt  Pt updated that transport to [facility] will be out front of ED in 10 mins  Pt verbalized understanding        Madhu Acharya RN  09/16/21 8737

## 2021-09-17 LAB
BACTERIA BLD CULT: NORMAL
BACTERIA BLD CULT: NORMAL

## 2021-10-12 LAB — FUNGUS SPEC CULT: NORMAL

## 2021-11-02 LAB
MYCOBACTERIUM SPEC CULT: NORMAL
RHODAMINE-AURAMINE STN SPEC: NORMAL

## 2022-02-05 ENCOUNTER — HOSPITAL ENCOUNTER (EMERGENCY)
Facility: HOSPITAL | Age: 57
Discharge: HOME | End: 2022-02-05
Attending: EMERGENCY MEDICINE
Payer: MEDICARE

## 2022-02-05 ENCOUNTER — APPOINTMENT (EMERGENCY)
Dept: RADIOLOGY | Facility: HOSPITAL | Age: 57
End: 2022-02-05
Payer: MEDICARE

## 2022-02-05 VITALS
DIASTOLIC BLOOD PRESSURE: 64 MMHG | OXYGEN SATURATION: 98 % | HEART RATE: 63 BPM | SYSTOLIC BLOOD PRESSURE: 96 MMHG | TEMPERATURE: 98 F | RESPIRATION RATE: 18 BRPM | WEIGHT: 162.4 LBS | BODY MASS INDEX: 27.02 KG/M2

## 2022-02-05 DIAGNOSIS — R07.9 CHEST PAIN, UNSPECIFIED: Primary | ICD-10-CM

## 2022-02-05 LAB
ANION GAP SERPL CALCULATED.3IONS-SCNC: 3 MMOL/L (ref 5–14)
ATRIAL RATE: 56 BPM
BASOPHILS # BLD AUTO: 0 THOUSANDS/ΜL (ref 0–0.1)
BASOPHILS NFR BLD AUTO: 1 % (ref 0–1)
BUN SERPL-MCNC: 16 MG/DL (ref 5–25)
CALCIUM SERPL-MCNC: 8.4 MG/DL (ref 8.4–10.2)
CARDIAC TROPONIN I PNL SERPL HS: 9 NG/L (ref 8–18)
CHLORIDE SERPL-SCNC: 96 MMOL/L (ref 97–108)
CO2 SERPL-SCNC: 36 MMOL/L (ref 22–30)
CREAT SERPL-MCNC: 0.89 MG/DL (ref 0.7–1.5)
EOSINOPHIL # BLD AUTO: 0.1 THOUSAND/ΜL (ref 0–0.4)
EOSINOPHIL NFR BLD AUTO: 2 % (ref 0–6)
ERYTHROCYTE [DISTWIDTH] IN BLOOD BY AUTOMATED COUNT: 13.3 %
ETHANOL SERPL-MCNC: <10 MG/DL (ref 0–10)
GFR SERPL CREATININE-BSD FRML MDRD: 94 ML/MIN/1.73SQ M
GLUCOSE SERPL-MCNC: 94 MG/DL (ref 70–99)
HCT VFR BLD AUTO: 39.6 % (ref 41–53)
HGB BLD-MCNC: 13.5 G/DL (ref 13.5–17.5)
LYMPHOCYTES # BLD AUTO: 2.2 THOUSANDS/ΜL (ref 0.5–4)
LYMPHOCYTES NFR BLD AUTO: 27 % (ref 25–45)
MCH RBC QN AUTO: 30.2 PG (ref 26–34)
MCHC RBC AUTO-ENTMCNC: 34.2 G/DL (ref 31–36)
MCV RBC AUTO: 89 FL (ref 80–100)
MONOCYTES # BLD AUTO: 0.6 THOUSAND/ΜL (ref 0.2–0.9)
MONOCYTES NFR BLD AUTO: 8 % (ref 1–10)
NEUTROPHILS # BLD AUTO: 5.2 THOUSANDS/ΜL (ref 1.8–7.8)
NEUTS SEG NFR BLD AUTO: 63 % (ref 45–65)
P AXIS: 71 DEGREES
PLATELET # BLD AUTO: 242 THOUSANDS/UL (ref 150–450)
PMV BLD AUTO: 5.8 FL (ref 8.9–12.7)
POTASSIUM SERPL-SCNC: 3.7 MMOL/L (ref 3.6–5)
PR INTERVAL: 166 MS
QRS AXIS: 59 DEGREES
QRSD INTERVAL: 72 MS
QT INTERVAL: 452 MS
QTC INTERVAL: 436 MS
RBC # BLD AUTO: 4.47 MILLION/UL (ref 4.5–5.9)
SODIUM SERPL-SCNC: 135 MMOL/L (ref 137–147)
T WAVE AXIS: 46 DEGREES
VENTRICULAR RATE: 56 BPM
WBC # BLD AUTO: 8.3 THOUSAND/UL (ref 4.5–11)

## 2022-02-05 PROCEDURE — 71045 X-RAY EXAM CHEST 1 VIEW: CPT

## 2022-02-05 PROCEDURE — 99285 EMERGENCY DEPT VISIT HI MDM: CPT | Performed by: EMERGENCY MEDICINE

## 2022-02-05 PROCEDURE — 82077 ASSAY SPEC XCP UR&BREATH IA: CPT | Performed by: EMERGENCY MEDICINE

## 2022-02-05 PROCEDURE — 84484 ASSAY OF TROPONIN QUANT: CPT | Performed by: EMERGENCY MEDICINE

## 2022-02-05 PROCEDURE — 99285 EMERGENCY DEPT VISIT HI MDM: CPT

## 2022-02-05 PROCEDURE — 96374 THER/PROPH/DIAG INJ IV PUSH: CPT

## 2022-02-05 PROCEDURE — 36415 COLL VENOUS BLD VENIPUNCTURE: CPT | Performed by: EMERGENCY MEDICINE

## 2022-02-05 PROCEDURE — 85025 COMPLETE CBC W/AUTO DIFF WBC: CPT | Performed by: EMERGENCY MEDICINE

## 2022-02-05 PROCEDURE — 80048 BASIC METABOLIC PNL TOTAL CA: CPT | Performed by: EMERGENCY MEDICINE

## 2022-02-05 PROCEDURE — 93005 ELECTROCARDIOGRAM TRACING: CPT

## 2022-02-05 PROCEDURE — 93010 ELECTROCARDIOGRAM REPORT: CPT | Performed by: INTERNAL MEDICINE

## 2022-02-05 RX ORDER — NALOXONE HYDROCHLORIDE 1 MG/ML
2 INJECTION INTRAMUSCULAR; INTRAVENOUS; SUBCUTANEOUS ONCE
Status: COMPLETED | OUTPATIENT
Start: 2022-02-05 | End: 2022-02-05

## 2022-02-05 RX ADMIN — NALOXONE HYDROCHLORIDE 2 MG: 1 INJECTION PARENTERAL at 19:04

## 2022-02-05 NOTE — ED PROCEDURE NOTE
PROCEDURE  ECG 12 Lead Documentation Only    Date/Time: 2/5/2022 6:51 PM  Performed by: Cam Aguila MD  Authorized by: Cam Aguila MD     Indications / Diagnosis:  Cp  ECG reviewed by me, the ED Provider: yes    Patient location:  ED  Interpretation:     Interpretation: normal    Rate:     ECG rate:  56    ECG rate assessment: bradycardic    Rhythm:     Rhythm: sinus bradycardia    Ectopy:     Ectopy: none    QRS:     QRS axis:  Normal    QRS intervals:  Normal  Conduction:     Conduction: normal    ST segments:     ST segments:  Normal  T waves:     T waves: normal           Cam Aguila MD  02/05/22 1857

## 2022-02-05 NOTE — ED PROVIDER NOTES
History  Chief Complaint   Patient presents with    Chest Pain     mid sternal chest pain that radiates into b/l shoulders, neck, and back that began this morning  no meds taken for symptoms  Patient is a 18-year-old male brought in by [employer] rep for a 1 day history of chest pain  Sternal CP that started this AM   Cannot remember what he was doing when it started  No meds taken  No history of similar pain  Does radiate to neck and back  No n/v  Does endorse some sob  Patient denies any drugs or etoh but does have a history of abuse  Rep states patient more tired looking than normal             Prior to Admission Medications   Prescriptions Last Dose Informant Patient Reported? Taking?   diclofenac sodium (VOLTAREN) 50 mg EC tablet   No No   Sig: Take 1 tablet (50 mg total) by mouth 2 (two) times a day   Patient not taking: Reported on 9/11/2021   naproxen (NAPROSYN) 500 mg tablet   No No   Sig: Take 1 tablet (500 mg total) by mouth 2 (two) times a day with meals   Patient not taking: Reported on 9/11/2021      Facility-Administered Medications: None       Past Medical History:   Diagnosis Date    Asthma     Heroin user        Past Surgical History:   Procedure Laterality Date    INCISION AND DRAINAGE OF WOUND Right 9/12/2021    Procedure: INCISION AND DRAINAGE (I&D) EXTREMITY;  Surgeon: Jess Godinez MD;  [facility];  Service: Plastics       History reviewed  No pertinent family history  I have reviewed and agree with the history as documented      E-Cigarette/Vaping    E-Cigarette Use Never User      E-Cigarette/Vaping Substances     Social History     Tobacco Use    Smoking status: Current Every Day Smoker     Packs/day: 1 00    Smokeless tobacco: Never Used   Vaping Use    Vaping Use: Never used   Substance Use Topics    Alcohol use: Never    Drug use: Yes     Types: Marijuana, Heroin, Cocaine, "Crack" cocaine     Comment: States he "uses about everything"       Review of Systems   Constitutional: Positive for fatigue  HENT: Negative  Eyes: Negative  Respiratory: Positive for shortness of breath  Cardiovascular: Positive for chest pain  Gastrointestinal: Negative  Endocrine: Negative  Genitourinary: Negative  Musculoskeletal: Negative  Skin: Negative  Allergic/Immunologic: Negative  Neurological: Negative  Hematological: Negative  Psychiatric/Behavioral: Negative  All other systems reviewed and are negative  Physical Exam  Physical Exam  Vitals and nursing note reviewed  Constitutional:       Appearance: He is well-developed and normal weight  HENT:      Head: Normocephalic and atraumatic  Eyes:      Comments: Pin point pupils  Cardiovascular:      Rate and Rhythm: Normal rate and regular rhythm  Heart sounds: Normal heart sounds  Pulmonary:      Effort: Pulmonary effort is normal       Breath sounds: Normal breath sounds  Abdominal:      General: Bowel sounds are normal       Palpations: Abdomen is soft  Musculoskeletal:         General: Normal range of motion  Right lower leg: No tenderness  No edema  Left lower leg: No tenderness  No edema  Skin:     General: Skin is dry  Neurological:      General: No focal deficit present  Mental Status: He is alert and oriented to person, place, and time  Psychiatric:         Mood and Affect: Affect is flat  Behavior: Behavior is withdrawn           Vital Signs  ED Triage Vitals [02/05/22 1848]   Temperature Pulse Respirations Blood Pressure SpO2   98 °F (36 7 °C) 66 20 146/94 96 %      Temp Source Heart Rate Source Patient Position - Orthostatic VS BP Location FiO2 (%)   Oral Monitor Sitting Left arm --      Pain Score       --           Vitals:    02/05/22 1848   BP: 146/94   Pulse: 66   Patient Position - Orthostatic VS: Sitting         Visual Acuity      ED Medications  Medications   naloxone (NARCAN) injection 2 mg (2 mg Intravenous Given 2/5/22 1904)       Diagnostic Studies  Results Reviewed     Procedure Component Value Units Date/Time    High Sensitivity Troponin I Random [[id number]]  (Normal) Collected: 02/05/22 1900    Lab Status: Final result Specimen: Blood from Arm, Right Updated: 02/05/22 1930     HS TnI random 9 ng/L     Basic metabolic panel [[id number]]  (Abnormal) Collected: 02/05/22 1900    Lab Status: Final result Specimen: Blood from Arm, Right Updated: 02/05/22 1929     Sodium 135 mmol/L      Potassium 3 7 mmol/L      Chloride 96 mmol/L      CO2 36 mmol/L      ANION GAP 3 mmol/L      BUN 16 mg/dL      Creatinine 0 89 mg/dL      Glucose 94 mg/dL      Calcium 8 4 mg/dL      eGFR 94 ml/min/1 73sq m     Narrative:      [place] guidelines for Chronic Kidney Disease (CKD):     Stage 1 with normal or high GFR (GFR > 90 mL/min/1 73 square meters)    Stage 2 Mild CKD (GFR = 60-89 mL/min/1 73 square meters)    Stage 3A Moderate CKD (GFR = 45-59 mL/min/1 73 square meters)    Stage 3B Moderate CKD (GFR = 30-44 mL/min/1 73 square meters)    Stage 4 Severe CKD (GFR = 15-29 mL/min/1 73 square meters)    Stage 5 End Stage CKD (GFR <15 mL/min/1 73 square meters)  Note: GFR calculation is accurate only with a steady state creatinine    Ethanol [[id number]]  (Normal) Collected: 02/05/22 1900    Lab Status: Final result Specimen: Blood from Arm, Right Updated: 02/05/22 1929     Ethanol Lvl <10 mg/dL     CBC and differential [[id number]]  (Abnormal) Collected: 02/05/22 1900    Lab Status: Final result Specimen: Blood from Arm, Right Updated: 02/05/22 1911     WBC 8 30 Thousand/uL      RBC 4 47 Million/uL      Hemoglobin 13 5 g/dL      Hematocrit 39 6 %      MCV 89 fL      MCH 30 2 pg      MCHC 34 2 g/dL      RDW 13 3 %      MPV 5 8 fL      Platelets 881 Thousands/uL      Neutrophils Relative 63 %      Lymphocytes Relative 27 %      Monocytes Relative 8 %      Eosinophils Relative 2 %      Basophils Relative 1 %      Neutrophils Absolute 5 20 Thousands/µL      Lymphocytes Absolute 2 20 Thousands/µL      Monocytes Absolute 0 60 Thousand/µL      Eosinophils Absolute 0 10 Thousand/µL      Basophils Absolute 0 00 Thousands/µL                  XR chest portable   ED Interpretation by Cayla Mike MD (02/05 1930)   nad                 Procedures  Procedures         ED Course             HEART Risk Score      Most Recent Value   Heart Score Risk Calculator    History 0 Filed at: 02/05/2022 1942   ECG 0 Filed at: 02/05/2022 1942   Age 1 Filed at: 02/05/2022 1942   Risk Factors 1 Filed at: 02/05/2022 1942   Troponin 0 Filed at: 02/05/2022 1942   HEART Score 2 Filed at: 02/05/2022 1942                        SBIRT 22yo+      Most Recent Value   SBIRT (25 yo +)    In order to provide better care to our patients, we are screening all of our patients for alcohol and drug use  Would it be okay to ask you these screening questions? No Filed at: 02/05/2022 1908                    [facility]  Number of Diagnoses or Management Options     Amount and/or Complexity of Data Reviewed  Clinical lab tests: ordered and reviewed  Tests in the radiology section of CPT®: reviewed and ordered  Tests in the medicine section of CPT®: reviewed and ordered  Obtain history from someone other than the patient: yes  Review and summarize past medical records: yes  Independent visualization of images, tracings, or specimens: yes        Disposition  Final diagnoses:   Chest pain, unspecified     Time reflects when diagnosis was documented in both MDM as applicable and the Disposition within this note     Time User Action Codes Description Comment    2/5/2022  7:42 PM Elmo Sullivan Add [R07 9] Chest pain, unspecified       ED Disposition     ED Disposition Condition Date/Time Comment    Discharge Stable Sat Feb 5, 2022  7:42 PM Dipti Randolph discharge to home/self care              Follow-up Information     Follow up With Specialties Details Why Contact Info Additional Information [facility] [id number]  [address], [address], [address] [place], [place], [address]          Patient's Medications   Discharge Prescriptions    No medications on file       No discharge procedures on file      PDMP Review     None          ED Provider  Electronically Signed by           Nelva Frankel, MD  02/05/22 0253

## 2022-02-06 NOTE — ED NOTES
Pt refused Narcan after it was charted as given  Dr Brandie Smith to talk to pt about medication        Twin De Paz RN  02/05/22 0835

## 2022-02-06 NOTE — ED NOTES
Pt's mental health advocate left contact information and phone number  Steve Leary [phone]       Aster Arevalo RN  02/05/22 7623

## 2022-10-06 ENCOUNTER — HOSPITAL ENCOUNTER (EMERGENCY)
Facility: HOSPITAL | Age: 57
Discharge: HOME | End: 2022-10-06
Attending: INTERNAL MEDICINE

## 2022-10-06 VITALS
BODY MASS INDEX: 29.69 KG/M2 | TEMPERATURE: 100 F | HEART RATE: 104 BPM | WEIGHT: 178.4 LBS | RESPIRATION RATE: 20 BRPM | DIASTOLIC BLOOD PRESSURE: 88 MMHG | SYSTOLIC BLOOD PRESSURE: 128 MMHG | OXYGEN SATURATION: 92 %

## 2022-10-06 DIAGNOSIS — F19.10 DRUG ABUSE (HCC): ICD-10-CM

## 2022-10-06 DIAGNOSIS — T50.901A ACCIDENTAL OVERDOSE: Primary | ICD-10-CM

## 2022-10-06 LAB — GLUCOSE SERPL-MCNC: 181 MG/DL (ref 65–140)

## 2022-10-06 PROCEDURE — 82948 REAGENT STRIP/BLOOD GLUCOSE: CPT

## 2022-10-06 PROCEDURE — 99282 EMERGENCY DEPT VISIT SF MDM: CPT | Performed by: INTERNAL MEDICINE

## 2022-10-06 PROCEDURE — 99284 EMERGENCY DEPT VISIT MOD MDM: CPT

## 2022-10-06 RX ORDER — NALOXONE HYDROCHLORIDE 1 MG/ML
2 INJECTION PARENTERAL ONCE
Status: COMPLETED | OUTPATIENT
Start: 2022-10-06 | End: 2022-10-06

## 2022-10-06 RX ORDER — NALOXONE HYDROCHLORIDE 1 MG/ML
INJECTION INTRAMUSCULAR; INTRAVENOUS; SUBCUTANEOUS
Status: COMPLETED
Start: 2022-10-06 | End: 2022-10-06

## 2022-10-06 NOTE — ED PROVIDER NOTES
History  Chief Complaint   Patient presents with    Overdose - Accidental     Patient was brought in by EMS  Patient was given 4 mg in total of narcan, 2mg IN and 2mg IV  Patient states he did some dope and that is why he needed to come here  HPI  70-year-old man brought in by ambulance for overdose  Patient was [place] [employer]  He was given 2 mg intranasal Narcan and did not respond  He then got 2 mg of IV Narcan and became alert  Patient reports he used 2 bags of dope today  Specifically he reports he snorted heroin  He denies any other drug use or alcohol use  He states he feels great and denies any physical complaints  Patient denies headache, visual changes, dizziness, fevers, chills, SOB, chest pain, palpitations, abdominal pain, diarrhea, dysuria, new skin rashes or numbness or tingling of the extremities  Prior to Admission Medications   Prescriptions Last Dose Informant Patient Reported? Taking?   diclofenac sodium (VOLTAREN) 50 mg EC tablet   No No   Sig: Take 1 tablet (50 mg total) by mouth 2 (two) times a day   Patient not taking: Reported on 9/11/2021   naproxen (NAPROSYN) 500 mg tablet   No No   Sig: Take 1 tablet (500 mg total) by mouth 2 (two) times a day with meals   Patient not taking: Reported on 9/11/2021      Facility-Administered Medications: None       Past Medical History:   Diagnosis Date    Asthma     Heroin user        Past Surgical History:   Procedure Laterality Date    INCISION AND DRAINAGE OF WOUND Right 9/12/2021    Procedure: INCISION AND DRAINAGE (I&D) EXTREMITY;  Surgeon: Val Hoyos MD;  [facility];  Service: Plastics       History reviewed  No pertinent family history  I have reviewed and agree with the history as documented      E-Cigarette/Vaping    E-Cigarette Use Never User      E-Cigarette/Vaping Substances     Social History     Tobacco Use    Smoking status: Current Every Day Smoker     Packs/day: 1 00    Smokeless tobacco: Never Used   Vaping Use    Vaping Use: Never used   Substance Use Topics    Alcohol use: Never    Drug use: Yes     Types: Marijuana, Heroin, Cocaine, "Crack" cocaine     Comment: States he "uses about everything"       Review of Systems   All other systems reviewed and are negative  Physical Exam  Physical Exam  PHYSICAL EXAM    Constitutional:  Well developed, well nourished, no acute distress, non-toxic appearance    HEENT:  Conjunctiva normal  Oropharynx moist  Respiratory:  No respiratory distress, normal breath sounds  Cardiovascular:  Normal rate, normal rhythm, no murmurs  GI:  Soft, nondistended, normal bowel sounds, nontender  :  No costovertebral angle tenderness   Musculoskeletal:  No edema, no tenderness, no deformities     Integument:  Well hydrated, no rash   Lymphatic:  No lymphadenopathy noted   Neurologic:  Alert & oriented, normal motor function, normal sensory function, no focal deficits noted   Psychiatric:  Speech and behavior appropriate     Vital Signs  ED Triage Vitals [10/06/22 1700]   Temperature Pulse Respirations Blood Pressure SpO2   100 °F (37 8 °C) 104 20 128/88 92 %      Temp Source Heart Rate Source Patient Position - Orthostatic VS BP Location FiO2 (%)   Tympanic Monitor Sitting Left arm --      Pain Score       --           Vitals:    10/06/22 1700   BP: 128/88   Pulse: 104   Patient Position - Orthostatic VS: Sitting         Visual Acuity      ED Medications  Medications   naloxone (FOR EMS ONLY) (NARCAN) 2 MG/2ML injection 4 mg (0 each Does not apply Given to EMS 10/6/22 1701)       Diagnostic Studies  Results Reviewed     Procedure Component Value Units Date/Time    Fingerstick Glucose (POCT) [[id number]]  (Abnormal) Collected: 10/06/22 1658    Lab Status: Final result Updated: 10/06/22 1658     POC Glucose 181 mg/dl                  No orders to display              Procedures  Procedures         ED Course MDM  Number of Diagnoses or Management Options  Accidental overdose  Drug abuse [facility])  Diagnosis management comments: 26-year-old man brought in by ambulance for overdose  Will observe patient in the ED for some time and re-dose Narcan as needed  Patient served in the ED for 70 minutes  He did not need any additional Narcan  Prior to discharge, patient was ambulating in the hallways, he was alert and oriented  He had no physical complaints  Patient did not wait for [facility] Narcan to be handed to him  Disposition  Final diagnoses:   Accidental overdose   Drug abuse ([address] )     Time reflects when diagnosis was documented in both MDM as applicable and the Disposition within this note     Time User Action Codes Description Comment    10/6/2022  6:05  [address] Accidental overdose     10/6/2022  6:06 PM Carley Rosado [F19 10] Drug abuse [facility])       ED Disposition     ED Disposition   Discharge    Condition   Stable    Date/Time   Thu Oct 6, 2022  6:05 PM    Comment   Leonel Hair discharge to home/self care  Follow-up Information    None         Discharge Medication List as of 10/6/2022  6:06 PM      CONTINUE these medications which have NOT CHANGED    Details   diclofenac sodium (VOLTAREN) 50 mg EC tablet Take 1 tablet (50 mg total) by mouth 2 (two) times a day, Starting Tue 1/1/2019, Print      naproxen (NAPROSYN) 500 mg tablet Take 1 tablet (500 mg total) by mouth 2 (two) times a day with meals, Starting Mon 9/9/2019, Print             No discharge procedures on file      PDMP Review     None          ED Provider  Electronically Signed by           Katty Bennett MD  10/06/22 4079

## 2022-10-11 ENCOUNTER — HOSPITAL ENCOUNTER (EMERGENCY)
Facility: HOSPITAL | Age: 57
Discharge: HOME | End: 2022-10-11
Attending: EMERGENCY MEDICINE

## 2022-10-11 VITALS
OXYGEN SATURATION: 95 % | TEMPERATURE: 97.7 F | RESPIRATION RATE: 16 BRPM | DIASTOLIC BLOOD PRESSURE: 79 MMHG | SYSTOLIC BLOOD PRESSURE: 134 MMHG | HEART RATE: 81 BPM

## 2022-10-11 DIAGNOSIS — F11.10 HEROIN ABUSE (HCC): Primary | ICD-10-CM

## 2022-10-11 DIAGNOSIS — T50.901A ACCIDENTAL OVERDOSE, INITIAL ENCOUNTER: ICD-10-CM

## 2022-10-11 LAB
ATRIAL RATE: 93 BPM
P AXIS: 86 DEGREES
PR INTERVAL: 160 MS
QRS AXIS: 30 DEGREES
QRSD INTERVAL: 82 MS
QT INTERVAL: 378 MS
QTC INTERVAL: 469 MS
T WAVE AXIS: 28 DEGREES
VENTRICULAR RATE: 93 BPM

## 2022-10-11 PROCEDURE — 99282 EMERGENCY DEPT VISIT SF MDM: CPT | Performed by: PHYSICIAN ASSISTANT

## 2022-10-11 PROCEDURE — 99284 EMERGENCY DEPT VISIT MOD MDM: CPT

## 2022-10-11 PROCEDURE — 93005 ELECTROCARDIOGRAM TRACING: CPT

## 2022-10-11 PROCEDURE — 93010 ELECTROCARDIOGRAM REPORT: CPT | Performed by: INTERNAL MEDICINE

## 2022-10-11 NOTE — ED PROVIDER NOTES
History  Chief Complaint   Patient presents with   • Overdose - Accidental     Brought in via EMS  Per EMS patient found unresponsive on the side walk  Was bagged by fire dept for 5mins prior to EMS arrival  Fire administered 4mg of narcan  Per EMS patient AFIB on the monitor  Pt via ambulance for sniffing heroin, pt was walking down the street , "I must have fallen out"  , ambulance called by unknown person,  Narcan administered by ambulance pt awake now feeling fine now declines any testing       Addiction Problem  Similar prior episodes: yes    Severity:  Moderate  Onset quality:  Sudden  Duration:  1 hour  Timing:  Constant  Progression:  Resolved  Chronicity:  Chronic  Suspected agents:  Heroin  Associated symptoms: no abdominal pain, no agitation and no confusion    Risk factors: no addiction treatment and no psychiatric hx        Prior to Admission Medications   Prescriptions Last Dose Informant Patient Reported? Taking?   diclofenac sodium (VOLTAREN) 50 mg EC tablet   No No   Sig: Take 1 tablet (50 mg total) by mouth 2 (two) times a day   Patient not taking: Reported on 9/11/2021   naproxen (NAPROSYN) 500 mg tablet   No No   Sig: Take 1 tablet (500 mg total) by mouth 2 (two) times a day with meals   Patient not taking: Reported on 9/11/2021      Facility-Administered Medications: None       Past Medical History:   Diagnosis Date   • Asthma    • Heroin user        Past Surgical History:   Procedure Laterality Date   • INCISION AND DRAINAGE OF WOUND Right 9/12/2021    Procedure: INCISION AND DRAINAGE (I&D) EXTREMITY;  Surgeon: Jennifer Way MD;  [facility];  Service: Plastics       History reviewed  No pertinent family history  I have reviewed and agree with the history as documented      E-Cigarette/Vaping   • E-Cigarette Use Never User      E-Cigarette/Vaping Substances     Social History     Tobacco Use   • Smoking status: Current Every Day Smoker     Packs/day: 1 00   • Smokeless tobacco: Never Used   Vaping Use   • Vaping Use: Never used   Substance Use Topics   • Alcohol use: Never   • Drug use: Yes     Types: Marijuana, Heroin, Cocaine, "Crack" cocaine     Comment: States he "uses about everything"       Review of Systems   Constitutional: Negative  HENT: Negative  Eyes: Negative  Respiratory: Negative  Cardiovascular: Negative  Gastrointestinal: Negative  Negative for abdominal pain  Endocrine: Negative  Genitourinary: Negative  Musculoskeletal: Negative  Skin: Negative  Allergic/Immunologic: Negative  Neurological: Negative  Hematological: Negative  Psychiatric/Behavioral: Negative  Negative for agitation and confusion  All other systems reviewed and are negative  Physical Exam  Physical Exam  Vitals and nursing note reviewed  Constitutional:       Appearance: Normal appearance  He is normal weight  Comments: Pt denies wanting to harm himeself or  Others     Pt oriented to place time day of week person , known president ,acting nomally    1120am  Pt feeling better wants to leave still declines any testing    HENT:      Head: Normocephalic and atraumatic  Right Ear: Tympanic membrane, ear canal and external ear normal       Left Ear: Tympanic membrane, ear canal and external ear normal       Nose: Nose normal       Mouth/Throat:      Mouth: Mucous membranes are moist       Pharynx: Oropharynx is clear  Eyes:      Extraocular Movements: Extraocular movements intact  Conjunctiva/sclera: Conjunctivae normal       Pupils: Pupils are equal, round, and reactive to light  Cardiovascular:      Rate and Rhythm: Normal rate and regular rhythm  Pulses: Normal pulses  Heart sounds: Normal heart sounds  Pulmonary:      Effort: Pulmonary effort is normal       Breath sounds: Normal breath sounds  Abdominal:      General: Abdomen is flat  Bowel sounds are normal       Palpations: Abdomen is soft     Musculoskeletal: General: Normal range of motion  Cervical back: Normal range of motion and neck supple  Skin:     General: Skin is warm  Capillary Refill: Capillary refill takes less than 2 seconds  Neurological:      General: No focal deficit present  Mental Status: He is alert and oriented to person, place, and time  Psychiatric:         Mood and Affect: Mood normal          Behavior: Behavior normal          Vital Signs  ED Triage Vitals   Temperature Pulse Respirations Blood Pressure SpO2   10/11/22 1035 10/11/22 1035 10/11/22 1035 10/11/22 1035 10/11/22 1035   97 7 °F (36 5 °C) 84 20 134/79 95 %      Temp Source Heart Rate Source Patient Position - Orthostatic VS BP Location FiO2 (%)   10/11/22 1035 10/11/22 1035 10/11/22 1035 10/11/22 1035 --   Tympanic Monitor Lying Left arm       Pain Score       10/11/22 1110       No Pain           Vitals:    10/11/22 1035 10/11/22 1110   BP: 134/79    Pulse: 84 81   Patient Position - Orthostatic VS: Lying          Visual Acuity      ED Medications  Medications - No data to display    Diagnostic Studies  Results Reviewed     None                 No orders to display              Procedures  Procedures         ED Course                               SBIRT 20yo+    Flowsheet Row Most Recent Value   SBIRT (23 yo +)    In order to provide better care to our patients, we are screening all of our patients for alcohol and drug use  Would it be okay to ask you these screening questions? No Filed at: 10/11/2022 1110                    MDM    Disposition  Final diagnoses:   Heroin abuse ([facility] [address] )   Accidental overdose, initial encounter     Time reflects when diagnosis was documented in both MDM as applicable and the Disposition within this note     Time User Action Codes Description Comment    10/11/2022 11:20 AM Regricel Kaiser  Add [F11 10] Heroin abuse ([address] )     10/11/2022 11:20 AM Dain Sprague   Add [T56 201A] Accidental overdose, initial encounter       ED Disposition ED Disposition   Discharge    Condition   Stable    Date/Time   Tue Oct 11, 2022 11:21 AM    Comment   Jennifer Tolentino discharge to home/self care  Follow-up Information     Follow up With Specialties Details Why Lucio Norris [address] [place] [address]  [phone]            Discharge Medication List as of 10/11/2022 11:21 AM      CONTINUE these medications which have NOT CHANGED    Details   diclofenac sodium (VOLTAREN) 50 mg EC tablet Take 1 tablet (50 mg total) by mouth 2 (two) times a day, Starting Tue 1/1/2019, Print      naproxen (NAPROSYN) 500 mg tablet Take 1 tablet (500 mg total) by mouth 2 (two) times a day with meals, Starting Mon 9/9/2019, Print             No discharge procedures on file      PDMP Review     None          ED Provider  Electronically Signed by           Toma Yancey PA-C  10/11/22 6920

## 2024-01-04 ENCOUNTER — HOSPITAL ENCOUNTER (EMERGENCY)
Facility: HOSPITAL | Age: 59
Discharge: HOME | End: 2024-01-04
Attending: EMERGENCY MEDICINE
Payer: COMMERCIAL

## 2024-01-04 ENCOUNTER — APPOINTMENT (EMERGENCY)
Dept: RADIOLOGY | Facility: HOSPITAL | Age: 59
End: 2024-01-04
Payer: COMMERCIAL

## 2024-01-04 VITALS
TEMPERATURE: 98.2 F | RESPIRATION RATE: 16 BRPM | WEIGHT: 179.68 LBS | OXYGEN SATURATION: 100 % | BODY MASS INDEX: 29.9 KG/M2 | DIASTOLIC BLOOD PRESSURE: 60 MMHG | SYSTOLIC BLOOD PRESSURE: 100 MMHG | HEART RATE: 44 BPM

## 2024-01-04 DIAGNOSIS — Z00.8 MEDICAL CLEARANCE FOR INCARCERATION: ICD-10-CM

## 2024-01-04 DIAGNOSIS — R07.9 CHEST PAIN, UNSPECIFIED TYPE: Primary | ICD-10-CM

## 2024-01-04 LAB
2HR DELTA HS TROPONIN: -1 NG/L
ALBUMIN SERPL BCP-MCNC: 3.8 G/DL (ref 3.5–5)
ALP SERPL-CCNC: 76 U/L (ref 34–104)
ALT SERPL W P-5'-P-CCNC: 23 U/L (ref 7–52)
ANION GAP SERPL CALCULATED.3IONS-SCNC: 5 MMOL/L
APAP SERPL-MCNC: <2 UG/ML (ref 10–20)
AST SERPL W P-5'-P-CCNC: 18 U/L (ref 13–39)
ATRIAL RATE: 46 BPM
ATRIAL RATE: 46 BPM
ATRIAL RATE: 50 BPM
BASOPHILS # BLD AUTO: 0.03 THOUSANDS/ÂΜL (ref 0–0.1)
BASOPHILS NFR BLD AUTO: 0 % (ref 0–1)
BILIRUB SERPL-MCNC: 0.59 MG/DL (ref 0.2–1)
BUN SERPL-MCNC: 14 MG/DL (ref 5–25)
CALCIUM SERPL-MCNC: 8.2 MG/DL (ref 8.4–10.2)
CARDIAC TROPONIN I PNL SERPL HS: 7 NG/L
CARDIAC TROPONIN I PNL SERPL HS: 8 NG/L
CHLORIDE SERPL-SCNC: 100 MMOL/L (ref 96–108)
CO2 SERPL-SCNC: 31 MMOL/L (ref 21–32)
CREAT SERPL-MCNC: 0.85 MG/DL (ref 0.6–1.3)
EOSINOPHIL # BLD AUTO: 0.14 THOUSAND/ÂΜL (ref 0–0.61)
EOSINOPHIL NFR BLD AUTO: 2 % (ref 0–6)
ERYTHROCYTE [DISTWIDTH] IN BLOOD BY AUTOMATED COUNT: 12.1 % (ref 11.6–15.1)
ETHANOL SERPL-MCNC: <10 MG/DL
GFR SERPL CREATININE-BSD FRML MDRD: 95 ML/MIN/1.73SQ M
GLUCOSE SERPL-MCNC: 109 MG/DL (ref 65–140)
HCT VFR BLD AUTO: 39.1 % (ref 36.5–49.3)
HGB BLD-MCNC: 12.5 G/DL (ref 12–17)
IMM GRANULOCYTES # BLD AUTO: 0.03 THOUSAND/UL (ref 0–0.2)
IMM GRANULOCYTES NFR BLD AUTO: 0 % (ref 0–2)
LIPASE SERPL-CCNC: 35 U/L (ref 11–82)
LYMPHOCYTES # BLD AUTO: 1.48 THOUSANDS/ÂΜL (ref 0.6–4.47)
LYMPHOCYTES NFR BLD AUTO: 20 % (ref 14–44)
MAGNESIUM SERPL-MCNC: 2.2 MG/DL (ref 1.9–2.7)
MCH RBC QN AUTO: 29.6 PG (ref 26.8–34.3)
MCHC RBC AUTO-ENTMCNC: 32 G/DL (ref 31.4–37.4)
MCV RBC AUTO: 92 FL (ref 82–98)
MONOCYTES # BLD AUTO: 0.44 THOUSAND/ÂΜL (ref 0.17–1.22)
MONOCYTES NFR BLD AUTO: 6 % (ref 4–12)
NEUTROPHILS # BLD AUTO: 5.38 THOUSANDS/ÂΜL (ref 1.85–7.62)
NEUTS SEG NFR BLD AUTO: 72 % (ref 43–75)
NRBC BLD AUTO-RTO: 0 /100 WBCS
P AXIS: 230 DEGREES
P AXIS: 233 DEGREES
P AXIS: 60 DEGREES
PLATELET # BLD AUTO: 185 THOUSANDS/UL (ref 149–390)
PMV BLD AUTO: 8 FL (ref 8.9–12.7)
POTASSIUM SERPL-SCNC: 4.2 MMOL/L (ref 3.5–5.3)
PR INTERVAL: 170 MS
PR INTERVAL: 176 MS
PR INTERVAL: 182 MS
PROT SERPL-MCNC: 6.3 G/DL (ref 6.4–8.4)
QRS AXIS: 245 DEGREES
QRS AXIS: 250 DEGREES
QRS AXIS: 41 DEGREES
QRSD INTERVAL: 72 MS
QRSD INTERVAL: 82 MS
QRSD INTERVAL: 84 MS
QT INTERVAL: 494 MS
QT INTERVAL: 498 MS
QT INTERVAL: 498 MS
QTC INTERVAL: 432 MS
QTC INTERVAL: 435 MS
QTC INTERVAL: 454 MS
RBC # BLD AUTO: 4.23 MILLION/UL (ref 3.88–5.62)
SALICYLATES SERPL-MCNC: <5 MG/DL (ref 3–20)
SODIUM SERPL-SCNC: 136 MMOL/L (ref 135–147)
T WAVE AXIS: 240 DEGREES
T WAVE AXIS: 243 DEGREES
T WAVE AXIS: 44 DEGREES
VENTRICULAR RATE: 46 BPM
VENTRICULAR RATE: 46 BPM
VENTRICULAR RATE: 50 BPM
WBC # BLD AUTO: 7.5 THOUSAND/UL (ref 4.31–10.16)

## 2024-01-04 PROCEDURE — 83735 ASSAY OF MAGNESIUM: CPT

## 2024-01-04 PROCEDURE — 99285 EMERGENCY DEPT VISIT HI MDM: CPT

## 2024-01-04 PROCEDURE — 83690 ASSAY OF LIPASE: CPT

## 2024-01-04 PROCEDURE — 96360 HYDRATION IV INFUSION INIT: CPT

## 2024-01-04 PROCEDURE — 82077 ASSAY SPEC XCP UR&BREATH IA: CPT

## 2024-01-04 PROCEDURE — 84484 ASSAY OF TROPONIN QUANT: CPT

## 2024-01-04 PROCEDURE — 71045 X-RAY EXAM CHEST 1 VIEW: CPT

## 2024-01-04 PROCEDURE — 36415 COLL VENOUS BLD VENIPUNCTURE: CPT

## 2024-01-04 PROCEDURE — 80179 DRUG ASSAY SALICYLATE: CPT

## 2024-01-04 PROCEDURE — 80143 DRUG ASSAY ACETAMINOPHEN: CPT

## 2024-01-04 PROCEDURE — 85025 COMPLETE CBC W/AUTO DIFF WBC: CPT

## 2024-01-04 PROCEDURE — 80053 COMPREHEN METABOLIC PANEL: CPT

## 2024-01-04 PROCEDURE — 93005 ELECTROCARDIOGRAM TRACING: CPT

## 2024-01-04 RX ORDER — SODIUM CHLORIDE 9 MG/ML
3 INJECTION INTRAVENOUS
Status: DISCONTINUED | OUTPATIENT
Start: 2024-01-04 | End: 2024-01-04 | Stop reason: HOSPADM

## 2024-01-04 RX ADMIN — SODIUM CHLORIDE 1000 ML: 0.9 INJECTION, SOLUTION INTRAVENOUS at 18:22

## 2024-01-04 NOTE — ED NOTES
Ambulated to restroom with steady coordinated gait with no disturbances.      Fermín Sanchez, CHANTELLE  01/04/24 4674

## 2024-01-05 NOTE — ED PROVIDER NOTES
"History  Chief Complaint   Patient presents with    Chest Pain     Arrives with [facility] for medical clearance- reporting chest pain that started today and is detoxing from \"a bunch of bullshit stuff.\"     Patient is a 58-year-old male coming in by way of [facility] for medical clearance for incarceration.  Patient told triage nurse that he does not want to go to prison, and the chest pain started earlier today.  He told me it has been ongoing for approximate last month, and occasionally he stops breathing, and he first noticed that he stopped breathing when he was having sex with his girlfriend.  Patient reports he drinks, as well as doing illicit drugs.  Speaking in full sentences, does walk with an abnormal gait which appears to be his baseline.  Does not appear to be intoxicated at this time.      Chest Pain  Pain location:  L chest  Associated symptoms: no abdominal pain, no altered mental status, no cough, no fatigue, no fever, no nausea, no numbness, no shortness of breath and not vomiting        Prior to Admission Medications   Prescriptions Last Dose Informant Patient Reported? Taking?   diclofenac sodium (VOLTAREN) 50 mg EC tablet   No No   Sig: Take 1 tablet (50 mg total) by mouth 2 (two) times a day   Patient not taking: Reported on 9/11/2021   naproxen (NAPROSYN) 500 mg tablet   No No   Sig: Take 1 tablet (500 mg total) by mouth 2 (two) times a day with meals   Patient not taking: Reported on 9/11/2021      Facility-Administered Medications: None       Past Medical History:   Diagnosis Date    Asthma     Heroin user        Past Surgical History:   Procedure Laterality Date    INCISION AND DRAINAGE OF WOUND Right 9/12/2021    Procedure: INCISION AND DRAINAGE (I&D) EXTREMITY;  Surgeon: Alex Aparicio MD;  Location: [facility];  Service: Plastics       History reviewed. No pertinent family history.  I have reviewed and agree with the history as documented.    E-Cigarette/Vaping    E-Cigarette Use Never User  " "    E-Cigarette/Vaping Substances     Social History     Tobacco Use    Smoking status: Every Day     Current packs/day: 1.00     Types: Cigarettes    Smokeless tobacco: Never   Vaping Use    Vaping status: Never Used   Substance Use Topics    Alcohol use: Yes    Drug use: Yes     Types: Marijuana, Heroin, Cocaine, \"Crack\" cocaine     Comment: States he \"uses about everything\"       Review of Systems   Constitutional:  Negative for chills, fatigue and fever.   Respiratory:  Negative for cough and shortness of breath.    Cardiovascular:  Positive for chest pain.   Gastrointestinal:  Negative for abdominal pain, nausea and vomiting.   Neurological:  Negative for numbness.       Physical Exam  Physical Exam  Vitals reviewed.   Constitutional:       Appearance: Normal appearance. He is normal weight.   HENT:      Head: Normocephalic and atraumatic.      Right Ear: External ear normal.      Left Ear: External ear normal.      Nose: Nose normal.   Eyes:      Conjunctiva/sclera: Conjunctivae normal.   Cardiovascular:      Rate and Rhythm: Normal rate.   Pulmonary:      Effort: Pulmonary effort is normal.   Musculoskeletal:         General: Normal range of motion.      Cervical back: Normal range of motion.   Skin:     General: Skin is warm and dry.   Neurological:      Mental Status: He is alert.         Vital Signs  ED Triage Vitals   Temperature Pulse Respirations Blood Pressure SpO2   01/04/24 1753 01/04/24 1722 01/04/24 1722 01/04/24 1722 01/04/24 1722   98.2 °F (36.8 °C) 55 16 105/75 100 %      Temp Source Heart Rate Source Patient Position - Orthostatic VS BP Location FiO2 (%)   01/04/24 1753 01/04/24 1722 01/04/24 1722 01/04/24 1722 --   Tympanic Monitor Sitting Left arm       Pain Score       --                  Vitals:    01/04/24 1830 01/04/24 1900 01/04/24 1951 01/04/24 2028   BP: 99/74 125/66 104/59 100/60   Pulse:  (!) 53 (!) 48 (!) 44   Patient Position - Orthostatic VS:  Sitting Lying          Visual " Acuity      ED Medications  Medications   sodium chloride (PF) 0.9 % injection 3 mL (has no administration in time range)   sodium chloride 0.9 % bolus 1,000 mL (0 mL Intravenous Stopped 1/4/24 1938)       Diagnostic Studies  Results Reviewed       Procedure Component Value Units Date/Time    HS Troponin I 2hr [[id number]]  (Normal) Collected: 01/04/24 1947    Lab Status: Final result Specimen: Blood from Arm, Right Updated: 01/04/24 2021     hs TnI 2hr 7 ng/L      Delta 2hr hsTnI -1 ng/L     HS Troponin I 4hr [[id number]]     Lab Status: No result Specimen: Blood     HS Troponin 0hr (reflex protocol) [[id number]]  (Normal) Collected: 01/04/24 1747    Lab Status: Final result Specimen: Blood from Arm, Right Updated: 01/04/24 1817     hs TnI 0hr 8 ng/L     Lipase [[id number]]  (Normal) Collected: 01/04/24 1747    Lab Status: Final result Specimen: Blood from Arm, Right Updated: 01/04/24 1815     Lipase 35 u/L     Acetaminophen level-If concentration is detectable, please discuss with medical  on call. [[id number]]  (Abnormal) Collected: 01/04/24 1747    Lab Status: Final result Specimen: Blood from Arm, Right Updated: 01/04/24 1815     Acetaminophen Level <2 ug/mL     Comprehensive metabolic panel [[id number]]  (Abnormal) Collected: 01/04/24 1747    Lab Status: Final result Specimen: Blood from Arm, Right Updated: 01/04/24 1815     Sodium 136 mmol/L      Potassium 4.2 mmol/L      Chloride 100 mmol/L      CO2 31 mmol/L      ANION GAP 5 mmol/L      BUN 14 mg/dL      Creatinine 0.85 mg/dL      Glucose 109 mg/dL      Calcium 8.2 mg/dL      AST 18 U/L      ALT 23 U/L      Alkaline Phosphatase 76 U/L      Total Protein 6.3 g/dL      Albumin 3.8 g/dL      Total Bilirubin 0.59 mg/dL      eGFR 95 ml/min/1.73sq m     Narrative:      National Kidney Disease Foundation guidelines for Chronic Kidney Disease (CKD):     Stage 1 with normal or high GFR (GFR > 90 mL/min/1.73 square meters)    Stage 2 Mild CKD (GFR = 60-89  mL/min/1.73 square meters)    Stage 3A Moderate CKD (GFR = 45-59 mL/min/1.73 square meters)    Stage 3B Moderate CKD (GFR = 30-44 mL/min/1.73 square meters)    Stage 4 Severe CKD (GFR = 15-29 mL/min/1.73 square meters)    Stage 5 End Stage CKD (GFR <15 mL/min/1.73 square meters)  Note: GFR calculation is accurate only with a steady state creatinine    Magnesium [[id number]]  (Normal) Collected: 01/04/24 1747    Lab Status: Final result Specimen: Blood from Arm, Right Updated: 01/04/24 1815     Magnesium 2.2 mg/dL     Salicylate level [[id number]]  (Normal) Collected: 01/04/24 1747    Lab Status: Final result Specimen: Blood from Arm, Right Updated: 01/04/24 1815     Salicylate Lvl <5 mg/dL     Ethanol [[id number]]  (Normal) Collected: 01/04/24 1747    Lab Status: Final result Specimen: Blood from Arm, Right Updated: 01/04/24 1810     Ethanol Lvl <10 mg/dL     CBC and differential [[id number]]  (Abnormal) Collected: 01/04/24 1747    Lab Status: Final result Specimen: Blood from Arm, Right Updated: 01/04/24 1755     WBC 7.50 Thousand/uL      RBC 4.23 Million/uL      Hemoglobin 12.5 g/dL      Hematocrit 39.1 %      MCV 92 fL      MCH 29.6 pg      MCHC 32.0 g/dL      RDW 12.1 %      MPV 8.0 fL      Platelets 185 Thousands/uL      nRBC 0 /100 WBCs      Neutrophils Relative 72 %      Immat GRANS % 0 %      Lymphocytes Relative 20 %      Monocytes Relative 6 %      Eosinophils Relative 2 %      Basophils Relative 0 %      Neutrophils Absolute 5.38 Thousands/µL      Immature Grans Absolute 0.03 Thousand/uL      Lymphocytes Absolute 1.48 Thousands/µL      Monocytes Absolute 0.44 Thousand/µL      Eosinophils Absolute 0.14 Thousand/µL      Basophils Absolute 0.03 Thousands/µL                    X-ray chest 1 view portable    (Results Pending)              Procedures  Procedures         ED Course  ED Course as of 01/04/24 2100   Thu Jan 04, 2024 1815 Anion Gap: 5   1817 hs TnI 0hr: 8 2023 Delta 2hr hsTnI: -1             HEART  Risk Score      Flowsheet Row Most Recent Value   Heart Score Risk Calculator    History 0 Filed at: 01/04/2024 2023   ECG 0 Filed at: 01/04/2024 2023   Age 1 Filed at: 01/04/2024 2023   Risk Factors 2 Filed at: 01/04/2024 2023   Troponin 0 Filed at: 01/04/2024 2023   HEART Score 3 Filed at: 01/04/2024 2023                                        Medical Decision Making  Patient is a 58-year-old male who comes in for evaluation of chest pain.  Patient story changed multiple times depending on who is talking to him.  Patient is in custody of police at this time.  No concern for ACS as EKG is overall benign, no significant rise in delta troponin.  Patient's chest x-ray is overall within normal limits.  Discharged into police custody, is currently medically clear for incarceration    Amount and/or Complexity of Data Reviewed  Labs: ordered. Decision-making details documented in ED Course.  Radiology: ordered.    Risk  Prescription drug management.             Disposition  Final diagnoses:   Chest pain, unspecified type   Medical clearance for incarceration     Time reflects when diagnosis was documented in both MDM as applicable and the Disposition within this note       Time User Action Codes Description Comment    1/4/2024  8:23 PM Kip Martinez Add [R07.9] Chest pain, unspecified type     1/4/2024  8:24 PM Kip Martinez [Z00.8] Medical clearance for incarceration           ED Disposition       ED Disposition   Discharge    Condition   Stable    Date/Time   Thu Jan 4, 2024 2023    Comment   Larry Valdovinos discharge to home/self care.                   Follow-up Information       Follow up With Specialties Details Why Contact Info Additional Information    [facility] Emergency Medicine  As needed, If symptoms worsen [address] [facility] [address] [phone] [facility]            Discharge Medication List  as of 1/4/2024  8:24 PM        CONTINUE these medications which have NOT CHANGED    Details   diclofenac sodium (VOLTAREN) 50 mg EC tablet Take 1 tablet (50 mg total) by mouth 2 (two) times a day, Starting Tue 1/1/2019, Print      naproxen (NAPROSYN) 500 mg tablet Take 1 tablet (500 mg total) by mouth 2 (two) times a day with meals, Starting Mon 9/9/2019, Print             No discharge procedures on file.    PDMP Review       None            ED Provider  Electronically Signed by             Kip Martinez PA-C  01/04/24 2100

## 2024-12-06 ENCOUNTER — PREP FOR PROCEDURE (OUTPATIENT)
Age: 59
End: 2024-12-06

## 2024-12-06 ENCOUNTER — TELEPHONE (OUTPATIENT)
Age: 59
End: 2024-12-06

## 2024-12-06 DIAGNOSIS — Z12.11 SCREENING FOR COLON CANCER: Primary | ICD-10-CM

## 2024-12-06 NOTE — TELEPHONE ENCOUNTER
12/06/24  Screened by: Heidy Connell    Referring Provider Jacque    Pre- Screening:     There is no height or weight on file to calculate BMI.  Has patient been referred for a routine screening Colonoscopy? yes  Is the patient between 45-75 years old? yes      Previous Colonoscopy no   If yes:    Date:     Facility:     Reason:         Does the patient want to see a Gastroenterologist prior to their procedure OR are they having any GI symptoms? no    Has the patient been hospitalized or had abdominal surgery in the past 6 months? no    Does the patient use supplemental oxygen? no    Does the patient take Coumadin, Lovenox, Plavix, Elliquis, Xarelto, or other blood thinning medication? no    Has the patient had a stroke, cardiac event, or stent placed in the past year? no      If patient is between 45yrs - 49yrs, please advise patient that we will have to confirm benefits & coverage with their insurance company for a routine screening colonoscopy.

## 2024-12-06 NOTE — TELEPHONE ENCOUNTER
Scheduled date of colonoscopy (as of today): 01/14/25  Physician performing colonoscopy: Doe  Location of colonoscopy:   Bowel prep reviewed with patient: fax to [phone]  Carmencita  Instructions reviewed with patient by: CEASAR  Clearances: N/A

## 2024-12-06 NOTE — TELEPHONE ENCOUNTER
Please fax NATHEN/DUL instructions to [facility] at [phone] attn Carmencita. Please send diabetic instructions also

## 2025-01-08 ENCOUNTER — TELEPHONE (OUTPATIENT)
Age: 60
End: 2025-01-08

## 2025-01-14 RX ORDER — SODIUM CHLORIDE, SODIUM LACTATE, POTASSIUM CHLORIDE, CALCIUM CHLORIDE 600; 310; 30; 20 MG/100ML; MG/100ML; MG/100ML; MG/100ML
125 INJECTION, SOLUTION INTRAVENOUS CONTINUOUS
OUTPATIENT
Start: 2025-01-14

## 2025-02-04 ENCOUNTER — TELEPHONE (OUTPATIENT)
Age: 60
End: 2025-02-04

## 2025-02-04 NOTE — TELEPHONE ENCOUNTER
Pina from [facility] calling to reschedule patients colonoscopy. Colonoscopy has been rescheduled for 3/25/25 with  at [place].  Pina is asking if Miralax Dulcolax prep instructions can be faxed to [phone].

## (undated) DEVICE — GLOVE SRG BIOGEL ECLIPSE 8

## (undated) DEVICE — ELECTRODE BLADE MOD E-Z CLEAN  2.75IN 7CM -0012AM

## (undated) DEVICE — INTENDED FOR TISSUE SEPARATION, AND OTHER PROCEDURES THAT REQUIRE A SHARP SURGICAL BLADE TO PUNCTURE OR CUT.: Brand: BARD-PARKER SAFETY BLADES SIZE 15, STERILE

## (undated) DEVICE — PLUMEPEN PRO 10FT

## (undated) DEVICE — INTENDED FOR TISSUE SEPARATION, AND OTHER PROCEDURES THAT REQUIRE A SHARP SURGICAL BLADE TO PUNCTURE OR CUT.: Brand: BARD-PARKER SAFETY BLADES SIZE 10, STERILE

## (undated) DEVICE — ABDOMINAL PAD: Brand: DERMACEA

## (undated) DEVICE — SCD SEQUENTIAL COMPRESSION COMFORT SLEEVE MEDIUM KNEE LENGTH: Brand: KENDALL SCD

## (undated) DEVICE — GAUZE SPONGES,16 PLY: Brand: CURITY

## (undated) DEVICE — GLOVE INDICATOR PI UNDERGLOVE SZ 8.5 BLUE

## (undated) DEVICE — KERLIX BANDAGE ROLL: Brand: KERLIX

## (undated) DEVICE — MEDI-VAC NON-CONDUCTIVE SUCTION TUBING 6MM X 1.8M (6FT.) L: Brand: CARDINAL HEALTH

## (undated) DEVICE — CHLORAPREP HI-LITE 26ML ORANGE

## (undated) DEVICE — BETHLEHEM UNIVERSAL OUTPATIENT: Brand: CARDINAL HEALTH

## (undated) DEVICE — PAD GROUNDING ADULT

## (undated) DEVICE — OCCLUSIVE GAUZE STRIP,3% BISMUTH TRIBROMOPHENATE IN PETROLATUM BLEND: Brand: XEROFORM

## (undated) DEVICE — CYSTO TUBING SINGLE IRRIGATION